# Patient Record
Sex: FEMALE | Race: WHITE | NOT HISPANIC OR LATINO | Employment: STUDENT | URBAN - METROPOLITAN AREA
[De-identification: names, ages, dates, MRNs, and addresses within clinical notes are randomized per-mention and may not be internally consistent; named-entity substitution may affect disease eponyms.]

---

## 2018-08-27 DIAGNOSIS — Z01.812 PRE-OPERATIVE LABORATORY EXAMINATION: ICD-10-CM

## 2018-08-27 LAB
ANION GAP SERPL CALC-SCNC: 8 MMOL/L (ref 0–11.9)
BUN SERPL-MCNC: 12 MG/DL (ref 8–22)
CALCIUM SERPL-MCNC: 9.5 MG/DL (ref 8.5–10.5)
CHLORIDE SERPL-SCNC: 102 MMOL/L (ref 96–112)
CO2 SERPL-SCNC: 27 MMOL/L (ref 20–33)
CREAT SERPL-MCNC: 0.78 MG/DL (ref 0.5–1.4)
ERYTHROCYTE [DISTWIDTH] IN BLOOD BY AUTOMATED COUNT: 39.1 FL (ref 35.9–50)
GLUCOSE SERPL-MCNC: 94 MG/DL (ref 65–99)
HCT VFR BLD AUTO: 42.8 % (ref 37–47)
HGB BLD-MCNC: 14.6 G/DL (ref 12–16)
MCH RBC QN AUTO: 30.9 PG (ref 27–33)
MCHC RBC AUTO-ENTMCNC: 34.1 G/DL (ref 33.6–35)
MCV RBC AUTO: 90.7 FL (ref 81.4–97.8)
PLATELET # BLD AUTO: 256 K/UL (ref 164–446)
PMV BLD AUTO: 10.4 FL (ref 9–12.9)
POTASSIUM SERPL-SCNC: 3.9 MMOL/L (ref 3.6–5.5)
RBC # BLD AUTO: 4.72 M/UL (ref 4.2–5.4)
SODIUM SERPL-SCNC: 137 MMOL/L (ref 135–145)
WBC # BLD AUTO: 7.6 K/UL (ref 4.8–10.8)

## 2018-08-27 PROCEDURE — 85027 COMPLETE CBC AUTOMATED: CPT

## 2018-08-27 PROCEDURE — 36415 COLL VENOUS BLD VENIPUNCTURE: CPT

## 2018-08-27 PROCEDURE — 80048 BASIC METABOLIC PNL TOTAL CA: CPT

## 2018-08-27 RX ORDER — BUPROPION HYDROCHLORIDE 150 MG/1
150 TABLET, EXTENDED RELEASE ORAL 2 TIMES DAILY
Status: ON HOLD | COMMUNITY
End: 2022-01-21

## 2018-08-27 RX ORDER — ACYCLOVIR 400 MG/1
400 TABLET ORAL 3 TIMES DAILY
Status: ON HOLD | COMMUNITY
End: 2022-01-21

## 2018-08-27 RX ORDER — DIPHENHYDRAMINE HCL 25 MG
25 TABLET ORAL NIGHTLY PRN
Status: ON HOLD | COMMUNITY
End: 2022-01-21

## 2018-08-27 RX ORDER — LEVOTHYROXINE SODIUM 112 UG/1
112 TABLET ORAL
COMMUNITY

## 2018-09-10 ENCOUNTER — HOSPITAL ENCOUNTER (OUTPATIENT)
Facility: MEDICAL CENTER | Age: 38
End: 2018-09-11
Attending: SPECIALIST | Admitting: SPECIALIST
Payer: COMMERCIAL

## 2018-09-10 DIAGNOSIS — G89.18 POST-OP PAIN: ICD-10-CM

## 2018-09-10 DIAGNOSIS — K56.7 ILEUS (HCC): ICD-10-CM

## 2018-09-10 DIAGNOSIS — F51.04 CHRONIC INSOMNIA: ICD-10-CM

## 2018-09-10 DIAGNOSIS — R11.0 NAUSEA: ICD-10-CM

## 2018-09-10 LAB — HCG SERPL QL: NEGATIVE

## 2018-09-10 PROCEDURE — 160041 HCHG SURGERY MINUTES - EA ADDL 1 MIN LEVEL 4: Performed by: SPECIALIST

## 2018-09-10 PROCEDURE — 160002 HCHG RECOVERY MINUTES (STAT): Performed by: SPECIALIST

## 2018-09-10 PROCEDURE — 84703 CHORIONIC GONADOTROPIN ASSAY: CPT

## 2018-09-10 PROCEDURE — 302135 SEQUENTIAL COMPRESSION MACHINE: Performed by: SPECIALIST

## 2018-09-10 PROCEDURE — 700101 HCHG RX REV CODE 250

## 2018-09-10 PROCEDURE — 501330 HCHG SET, CYSTO IRRIG TUBING: Performed by: SPECIALIST

## 2018-09-10 PROCEDURE — 700111 HCHG RX REV CODE 636 W/ 250 OVERRIDE (IP)

## 2018-09-10 PROCEDURE — 700102 HCHG RX REV CODE 250 W/ 637 OVERRIDE(OP)

## 2018-09-10 PROCEDURE — 500854 HCHG NEEDLE, INSUFFLATION FOR STEP: Performed by: SPECIALIST

## 2018-09-10 PROCEDURE — 501838 HCHG SUTURE GENERAL: Performed by: SPECIALIST

## 2018-09-10 PROCEDURE — 160036 HCHG PACU - EA ADDL 30 MINS PHASE I: Performed by: SPECIALIST

## 2018-09-10 PROCEDURE — 500800 HCHG LAPAROSCOPIC J/L HOOK: Performed by: SPECIALIST

## 2018-09-10 PROCEDURE — 160035 HCHG PACU - 1ST 60 MINS PHASE I: Performed by: SPECIALIST

## 2018-09-10 PROCEDURE — 160048 HCHG OR STATISTICAL LEVEL 1-5: Performed by: SPECIALIST

## 2018-09-10 PROCEDURE — 51798 US URINE CAPACITY MEASURE: CPT

## 2018-09-10 PROCEDURE — 502703 HCHG DEVICE, LIGASURE V SEALER: Performed by: SPECIALIST

## 2018-09-10 PROCEDURE — A9270 NON-COVERED ITEM OR SERVICE: HCPCS | Performed by: SPECIALIST

## 2018-09-10 PROCEDURE — 700102 HCHG RX REV CODE 250 W/ 637 OVERRIDE(OP): Performed by: SPECIALIST

## 2018-09-10 PROCEDURE — 700104 HCHG RX REV CODE 254

## 2018-09-10 PROCEDURE — 500002 HCHG ADHESIVE, DERMABOND: Performed by: SPECIALIST

## 2018-09-10 PROCEDURE — A9270 NON-COVERED ITEM OR SERVICE: HCPCS

## 2018-09-10 PROCEDURE — G0378 HOSPITAL OBSERVATION PER HR: HCPCS

## 2018-09-10 PROCEDURE — 500886 HCHG PACK, LAPAROSCOPY: Performed by: SPECIALIST

## 2018-09-10 PROCEDURE — A4338 INDWELLING CATHETER LATEX: HCPCS | Performed by: SPECIALIST

## 2018-09-10 PROCEDURE — 502587 HCHG PACK, D&C: Performed by: SPECIALIST

## 2018-09-10 PROCEDURE — 501577 HCHG TROCAR, STEP 11MM: Performed by: SPECIALIST

## 2018-09-10 PROCEDURE — 160009 HCHG ANES TIME/MIN: Performed by: SPECIALIST

## 2018-09-10 PROCEDURE — 160029 HCHG SURGERY MINUTES - 1ST 30 MINS LEVEL 4: Performed by: SPECIALIST

## 2018-09-10 PROCEDURE — 88307 TISSUE EXAM BY PATHOLOGIST: CPT

## 2018-09-10 RX ORDER — OXYCODONE HYDROCHLORIDE AND ACETAMINOPHEN 5; 325 MG/1; MG/1
1-2 TABLET ORAL EVERY 4 HOURS PRN
Status: DISCONTINUED | OUTPATIENT
Start: 2018-09-10 | End: 2018-09-11 | Stop reason: HOSPADM

## 2018-09-10 RX ORDER — IBUPROFEN 800 MG/1
800 TABLET ORAL EVERY 8 HOURS PRN
Status: DISCONTINUED | OUTPATIENT
Start: 2018-09-10 | End: 2018-09-11 | Stop reason: HOSPADM

## 2018-09-10 RX ORDER — SODIUM CHLORIDE, SODIUM LACTATE, POTASSIUM CHLORIDE, CALCIUM CHLORIDE 600; 310; 30; 20 MG/100ML; MG/100ML; MG/100ML; MG/100ML
INJECTION, SOLUTION INTRAVENOUS CONTINUOUS
Status: DISCONTINUED | OUTPATIENT
Start: 2018-09-10 | End: 2018-09-11 | Stop reason: HOSPADM

## 2018-09-10 RX ORDER — OXYCODONE HCL 5 MG/5 ML
SOLUTION, ORAL ORAL
Status: COMPLETED
Start: 2018-09-10 | End: 2018-09-10

## 2018-09-10 RX ORDER — HYDROMORPHONE HYDROCHLORIDE 2 MG/ML
INJECTION, SOLUTION INTRAMUSCULAR; INTRAVENOUS; SUBCUTANEOUS
Status: COMPLETED
Start: 2018-09-10 | End: 2018-09-10

## 2018-09-10 RX ORDER — SIMETHICONE 80 MG
80 TABLET,CHEWABLE ORAL EVERY 8 HOURS PRN
Status: DISCONTINUED | OUTPATIENT
Start: 2018-09-10 | End: 2018-09-11 | Stop reason: HOSPADM

## 2018-09-10 RX ORDER — BUPIVACAINE HYDROCHLORIDE AND EPINEPHRINE 2.5; 5 MG/ML; UG/ML
INJECTION, SOLUTION INFILTRATION; PERINEURAL
Status: DISCONTINUED | OUTPATIENT
Start: 2018-09-10 | End: 2018-09-10 | Stop reason: HOSPADM

## 2018-09-10 RX ORDER — ONDANSETRON 2 MG/ML
4 INJECTION INTRAMUSCULAR; INTRAVENOUS EVERY 6 HOURS PRN
Status: DISCONTINUED | OUTPATIENT
Start: 2018-09-10 | End: 2018-09-11 | Stop reason: HOSPADM

## 2018-09-10 RX ORDER — ZOLPIDEM TARTRATE 5 MG/1
5 TABLET ORAL NIGHTLY PRN
Status: DISCONTINUED | OUTPATIENT
Start: 2018-09-10 | End: 2018-09-11 | Stop reason: HOSPADM

## 2018-09-10 RX ADMIN — OXYCODONE HYDROCHLORIDE AND ACETAMINOPHEN 1 TABLET: 5; 325 TABLET ORAL at 23:07

## 2018-09-10 RX ADMIN — FENTANYL CITRATE 50 MCG: 50 INJECTION, SOLUTION INTRAMUSCULAR; INTRAVENOUS at 19:00

## 2018-09-10 RX ADMIN — FENTANYL CITRATE 50 MCG: 50 INJECTION, SOLUTION INTRAMUSCULAR; INTRAVENOUS at 19:08

## 2018-09-10 RX ADMIN — OXYCODONE HYDROCHLORIDE AND ACETAMINOPHEN 1 TABLET: 5; 325 TABLET ORAL at 22:39

## 2018-09-10 RX ADMIN — SODIUM CHLORIDE, SODIUM LACTATE, POTASSIUM CHLORIDE, CALCIUM CHLORIDE 1000 ML: 600; 310; 30; 20 INJECTION, SOLUTION INTRAVENOUS at 12:15

## 2018-09-10 RX ADMIN — OXYCODONE HYDROCHLORIDE 10 MG: 5 SOLUTION ORAL at 19:00

## 2018-09-10 RX ADMIN — FENTANYL CITRATE 50 MCG: 50 INJECTION, SOLUTION INTRAMUSCULAR; INTRAVENOUS at 19:18

## 2018-09-10 RX ADMIN — HYDROMORPHONE HYDROCHLORIDE 0.2 MG: 2 INJECTION INTRAMUSCULAR; INTRAVENOUS; SUBCUTANEOUS at 19:36

## 2018-09-10 ASSESSMENT — PATIENT HEALTH QUESTIONNAIRE - PHQ9
1. LITTLE INTEREST OR PLEASURE IN DOING THINGS: NOT AT ALL
2. FEELING DOWN, DEPRESSED, IRRITABLE, OR HOPELESS: NOT AT ALL
SUM OF ALL RESPONSES TO PHQ9 QUESTIONS 1 AND 2: 0

## 2018-09-10 ASSESSMENT — PAIN SCALES - GENERAL
PAINLEVEL_OUTOF10: 5
PAINLEVEL_OUTOF10: 4
PAINLEVEL_OUTOF10: 7
PAINLEVEL_OUTOF10: 9
PAINLEVEL_OUTOF10: 6
PAINLEVEL_OUTOF10: 0
PAINLEVEL_OUTOF10: 8
PAINLEVEL_OUTOF10: 8
PAINLEVEL_OUTOF10: 0
PAINLEVEL_OUTOF10: 8
PAINLEVEL_OUTOF10: 4

## 2018-09-10 ASSESSMENT — LIFESTYLE VARIABLES: EVER_SMOKED: NEVER

## 2018-09-10 NOTE — H&P
Sarah Nelson          YOB: 1980  Date of today's surgery: Monday, September 10, 2018  Facility: [facility]    ID: The patient is a very pleasant 38-year-old multipara (para-2, with 2 previous  sections).    Chief Complaint: The patient complains of menorrhagia accompanied by dysmenorrhea.    History of Present Illness: The patient has had complaints of menorrhagia and dysmenorrhea and has been diagnosed as having endometriosis. The patient is a very pleasant 38-year-old multipara with 2 previous  sections who has had menorrhagia and dysmenorrhea and she is scheduled today to have a total of is, hysterectomy, bilateral salpingectomy, followed by cystoscopy.  She has a history of endometriosis.  She also has a history of premenstrual migraine headaches.  She has been on Lupron.  She discontinued Lupron Astudillo she discontinued the Lupron she has had multiple menstrual periods and she says that since her Lupron therapy her menstrual periods have been characterized by very heavy blood flow.  She does have dysmenorrhea.  She complains of pain with ovulation.  She says that following her last Lupron injection she has been having severe joint pain and she says that she believes that this joint pain was caused by Lupron and she says that it was because of this what she feels is a side effect of Lupron (her arthralgias) that she decided to stop taking the Lupron.  I have discussed with the patient and explained to the patient in detail and at length what total laparoscopic hysterectomy, bilateral salpingectomy, followed by cystoscopy is and what total laparoscopic hysterectomy, bilateral salpingectomy, followed by cystoscopy involves, and I have discussed with her and explained to her in detail and at length the risks and benefits and alternatives of total laparoscopic hysterectomy, bilateral salpingectomy, followed by cystoscopy.  After our discussions and after  answering her questions she told me that she very much wishes for us to proceed with total laparoscopic hysterectomy, bilateral salpingectomy, followed by cystoscopy.    Past Medical History: The patient says that she has been diagnosed as having Hashimoto’s thyroiditis.  She has a history of migraine headaches.    Past Surgical History: The patient has had breast augmentation.  She has had 2 previous  sections.    Family History: non contributory    Medications: The patient says that she takes levothyroxine 125 micrograms per day.  She also takes Wellbutrin 150 milligrams once per day.    Allergies: The patient says that she is allergic to sulfa drugs.    Social History: The patient denies smoking.  She says she only rarely consumes alcoholic beverages.  She denies the use of recreational drugs.    Review of Systems  General: The patient denies any fevers, chills, sweats.  Pulmonary: The patient denies any coughing, wheezing, chest pain, shortness of breath.  Cardiovascular: The patient denies any palpitations, dyspnea, chest pain.  Gastrointestinal: The patient denies any nausea, vomiting, diarrhea, constipation, hematochezia, melena, history of hepatitis, history of jaundice.  Genitourinary: The patient complains of menorrhagia and dysmenorrhea  Musculoskeletal: The patient has had arthralgias following her last Lupron injection.  She denies myalgias.   Neurological: The patient says she has history of migraine headaches.  She denies any syncope or seizures.     Physical Exam:   Vital Signs: The patient's vital signs are stable and she is afebrile.  General: The patient appears well developed and well nourished and relaxed and alert and comfortable and in no apparent distress.    HEENT :  Normo-cephalic, atraumatic, pupils equal, round, reactive to light and accommodation, extra ocular motions intact, pharynx clear; there is no thyromegaly. There is no cervical lymphadenopathy.  Chest: Heart regular rate  and rhythm, with no murmurs or rubs or gallops; the lungs are clear to auscultation bilaterally.  Abdomen: The abdomen is soft and flat and non-tender and non-distended. There is no hepatomegaly. There is no splenomegaly.   Pelvic: Speculum exam reveals that there are no vulvar or vaginal or cervical lesions and that there is no cervical or vaginal discharge for the vulva and vaginal mucosa appear well estrogenized.  Bimanual exam reveals no evidence of cervical motion tenderness and no evidence of any tenderness to palpation of the uterine corpus and no evidence of uterine enlargement and no evidence of any adnexal masses or tenderness either on the right or the left.  Extremities: No clubbing or cyanosis or edema.   Neurological: non-focal.     Assessment:   Endometriosis  Menorrhagia  Dysmenorrhea    Plan:   We will proceed today with total laparoscopic hysterectomy, bilateral salpingectomy, followed by cystoscopy.  Please see above.            ________________________  Akash Cullen M.D.

## 2018-09-11 VITALS
HEART RATE: 108 BPM | OXYGEN SATURATION: 96 % | WEIGHT: 193.78 LBS | HEIGHT: 63 IN | SYSTOLIC BLOOD PRESSURE: 102 MMHG | BODY MASS INDEX: 34.34 KG/M2 | RESPIRATION RATE: 18 BRPM | TEMPERATURE: 98.3 F | DIASTOLIC BLOOD PRESSURE: 59 MMHG

## 2018-09-11 LAB
BASOPHILS # BLD AUTO: 0.2 % (ref 0–1.8)
BASOPHILS # BLD: 0.03 K/UL (ref 0–0.12)
EOSINOPHIL # BLD AUTO: 0 K/UL (ref 0–0.51)
EOSINOPHIL NFR BLD: 0 % (ref 0–6.9)
ERYTHROCYTE [DISTWIDTH] IN BLOOD BY AUTOMATED COUNT: 40.5 FL (ref 35.9–50)
HCT VFR BLD AUTO: 36.8 % (ref 37–47)
HGB BLD-MCNC: 12.5 G/DL (ref 12–16)
IMM GRANULOCYTES # BLD AUTO: 0.06 K/UL (ref 0–0.11)
IMM GRANULOCYTES NFR BLD AUTO: 0.4 % (ref 0–0.9)
LYMPHOCYTES # BLD AUTO: 1.07 K/UL (ref 1–4.8)
LYMPHOCYTES NFR BLD: 7.2 % (ref 22–41)
MCH RBC QN AUTO: 31.6 PG (ref 27–33)
MCHC RBC AUTO-ENTMCNC: 34 G/DL (ref 33.6–35)
MCV RBC AUTO: 93.2 FL (ref 81.4–97.8)
MONOCYTES # BLD AUTO: 0.75 K/UL (ref 0–0.85)
MONOCYTES NFR BLD AUTO: 5.1 % (ref 0–13.4)
NEUTROPHILS # BLD AUTO: 12.93 K/UL (ref 2–7.15)
NEUTROPHILS NFR BLD: 87.1 % (ref 44–72)
NRBC # BLD AUTO: 0 K/UL
NRBC BLD-RTO: 0 /100 WBC
PLATELET # BLD AUTO: 180 K/UL (ref 164–446)
PMV BLD AUTO: 10.6 FL (ref 9–12.9)
RBC # BLD AUTO: 3.95 M/UL (ref 4.2–5.4)
WBC # BLD AUTO: 14.8 K/UL (ref 4.8–10.8)

## 2018-09-11 PROCEDURE — G0378 HOSPITAL OBSERVATION PER HR: HCPCS

## 2018-09-11 PROCEDURE — 85025 COMPLETE CBC W/AUTO DIFF WBC: CPT

## 2018-09-11 PROCEDURE — A9270 NON-COVERED ITEM OR SERVICE: HCPCS | Performed by: SPECIALIST

## 2018-09-11 PROCEDURE — 700102 HCHG RX REV CODE 250 W/ 637 OVERRIDE(OP): Performed by: SPECIALIST

## 2018-09-11 RX ORDER — IBUPROFEN 800 MG/1
800 TABLET ORAL EVERY 8 HOURS PRN
Qty: 30 TAB | Refills: 0 | Status: ON HOLD | OUTPATIENT
Start: 2018-09-11 | End: 2022-01-21

## 2018-09-11 RX ORDER — OXYCODONE HYDROCHLORIDE AND ACETAMINOPHEN 5; 325 MG/1; MG/1
1 TABLET ORAL EVERY 6 HOURS PRN
Qty: 28 TAB | Refills: 0 | Status: SHIPPED | OUTPATIENT
Start: 2018-09-11 | End: 2018-09-18

## 2018-09-11 RX ADMIN — OXYCODONE HYDROCHLORIDE AND ACETAMINOPHEN 1 TABLET: 5; 325 TABLET ORAL at 03:42

## 2018-09-11 RX ADMIN — OXYCODONE HYDROCHLORIDE AND ACETAMINOPHEN 2 TABLET: 5; 325 TABLET ORAL at 08:28

## 2018-09-11 RX ADMIN — IBUPROFEN 800 MG: 800 TABLET, FILM COATED ORAL at 03:40

## 2018-09-11 ASSESSMENT — LIFESTYLE VARIABLES
ALCOHOL_USE: YES
AVERAGE NUMBER OF DAYS PER WEEK YOU HAVE A DRINK CONTAINING ALCOHOL: 0
EVER FELT BAD OR GUILTY ABOUT YOUR DRINKING: NO
TOTAL SCORE: 0
HAVE YOU EVER FELT YOU SHOULD CUT DOWN ON YOUR DRINKING: NO
HOW MANY TIMES IN THE PAST YEAR HAVE YOU HAD 5 OR MORE DRINKS IN A DAY: 0
CONSUMPTION TOTAL: NEGATIVE
TOTAL SCORE: 0
EVER HAD A DRINK FIRST THING IN THE MORNING TO STEADY YOUR NERVES TO GET RID OF A HANGOVER: NO
TOTAL SCORE: 0
HAVE PEOPLE ANNOYED YOU BY CRITICIZING YOUR DRINKING: NO
ON A TYPICAL DAY WHEN YOU DRINK ALCOHOL HOW MANY DRINKS DO YOU HAVE: 1

## 2018-09-11 ASSESSMENT — COPD QUESTIONNAIRES
COPD SCREENING SCORE: 0
DURING THE PAST 4 WEEKS HOW MUCH DID YOU FEEL SHORT OF BREATH: NONE/LITTLE OF THE TIME
IN THE PAST 12 MONTHS DO YOU DO LESS THAN YOU USED TO BECAUSE OF YOUR BREATHING PROBLEMS: DISAGREE/UNSURE
HAVE YOU SMOKED AT LEAST 100 CIGARETTES IN YOUR ENTIRE LIFE: NO/DON'T KNOW
DO YOU EVER COUGH UP ANY MUCUS OR PHLEGM?: NO/ONLY WITH OCCASIONAL COLDS OR INFECTIONS

## 2018-09-11 ASSESSMENT — PAIN SCALES - GENERAL
PAINLEVEL_OUTOF10: 7
PAINLEVEL_OUTOF10: 5
PAINLEVEL_OUTOF10: 3

## 2018-09-11 NOTE — PROGRESS NOTES
Pt able to void without difficulty, quantity sufficient, bladder scan less than 200. Denies any bladder distention. peripad with very scant serosang drainage. No n/v.

## 2018-09-11 NOTE — PROGRESS NOTES
The patient is today postoperative day #1 status post total laparoscopic hysterectomy and bilateral salpingectomy followed by cystoscopy.  She tells me today that she has some abdominal and pelvic soreness and pelvic cramping pain. She says she feels fine otherwise and has no other problems or complaints.  The patient says that her Holden catheter was removed earlier this morning and says that she subsequently has been able to empty her bladder. She is ambulating and urinating and tolerating a regular diet.  The patient's vital signs are stable and she is afebrile.  The patient this morning appears well-developed and well-nourished and relaxed and alert and comfortable and in no apparent distress.  Labs: The patient's hemoglobin this morning is 12.5 g/dL. Her white count this morning is 14.8 and her platelet count this morning is 180,000.  The patient says that she would be interested in going home today.  We will discharge the patient home today with prescriptions for Percocet and ibuprofen. I asked her to follow-up with me in the office in about 2 weeks for postoperative visit and she replied that she aren't he has this appointment scheduled. I asked her to call or contact me at any time should she ever has any problems or questions or complaints and she said that she would do so.  Akash Cullen M.D.

## 2018-09-11 NOTE — OR NURSING
RECEIVED FROM OR WITH DR MEDEROS.  ORAL AIRWAY IN PLACE.  VSS.  AIRWAY DC'D.  PATIENT DENIES PAIN/NAUSEA.  BAND-AIDS ON ABDOMEN DRY AND IN TACT X 3.  ENMANUEL PAD DRY.  MERRITT CATHETER IN PLACE.  1855 C/O PAIN 8/10.  MEDICATED PER ORDER.  TOLERATING PO FLUIDS.  1900 REPORT TO YOVANY SEGOVIA.

## 2018-09-11 NOTE — PROGRESS NOTES
Pt admitted to room T212 from PACU at 2000.  Pt A&O . Abdominal pain reported at 5 on a scale of 0-10. Oriented to room call light and vitals frequency. Lap sites x4 on abdomen with band aid CDI. hypoactive BS t/o. No flatus. Reviewed plan of care: voiding trial, labs diet, fall precautions, skin care, labs,and pain management) with patient and family. Admit profile done. Passed RN bedside swallow evaluation without s/sx of aspiration. All questions answered. Verbalized understanding and agrees. Able to make needs known.

## 2018-09-11 NOTE — PROGRESS NOTES
Assumed care of Pt at 0700 after report from May, RN, assessment complete.  Pt resting comfortably, A & O X 4, VSS; Pt denies pain, discomfort at this time; updated on and understands POC to discharge this AM.  Bed in low position, call light within reach - will continue to monitor.    Pt given and understands discharge instructions, (2) Rx.  PIV removed, covered and wrapped with coban.  Waiting for ride home with .

## 2018-09-11 NOTE — DISCHARGE INSTRUCTIONS
Hysterectomy Information  A hysterectomy is a surgery to remove your uterus. After surgery, you will no longer have periods. Also, you will not be able to get pregnant.  Reasons for this surgery  · You have bleeding that is not normal and keeps coming back.  · You have lasting (chronic) lower belly (pelvic) pain.  · You have a lasting infection.  · The lining of your uterus grows outside your uterus.  · The lining of your uterus grows in the muscle of your uterus.  · Your uterus falls down into your vagina.  · You have a growth in your uterus that causes problems.  · You have cells that could turn into cancer (precancerous cells).  · You have cancer of the uterus or cervix.  Types  There are 3 types of hysterectomies. Depending on the type, the surgery will:  · Remove the top part of the uterus only.  · Remove the uterus and the cervix.  · Remove the uterus, cervix, and tissue that holds the uterus in place in the lower belly.  Ways a hysterectomy can be performed  There are 5 ways this surgery can be performed.  · A cut (incision) is made in the belly (abdomen). The uterus is taken out through the cut.  · A cut is made in the vagina. The uterus is taken out through the cut.  · Three or four cuts are made in the belly. A surgical device with a camera is put through one of the cuts. The uterus is cut into small pieces. The uterus is taken out through the cuts or the vagina.  · Three or four cuts are made in the belly. A surgical device with a camera is put through one of the cuts. The uterus is taken out through the vagina.  · Three or four cuts are made in the belly. A surgical device that is controlled by a computer makes a visual image. The device helps the surgeon control the surgical tools. The uterus is cut into small pieces. The pieces are taken out through the cuts or through the vagina.  What can I expect after the surgery?  · You will be given pain medicine.  · You will need help at home for 3-5 days after  surgery.  · You will need to see your doctor in 2-4 weeks after surgery.  · You may get hot flashes, have night sweats, and have trouble sleeping.  · You may need to have Pap tests in the future if your surgery was related to cancer. Talk to your doctor. It is still good to have regular exams.  This information is not intended to replace advice given to you by your health care provider. Make sure you discuss any questions you have with your health care provider.  Document Released: 03/11/2013 Document Revised: 05/25/2017 Document Reviewed: 08/25/2014  [employer] Interactive Patient Education © 2017 [employer]    Discharge Instructions    Discharged to home by car with relative. Discharged via walking, hospital escort: Yes.  Special equipment needed: Not Applicable    Be sure to schedule a follow-up appointment with your primary care doctor or any specialists as instructed.     Discharge Plan:   Diet Plan: Discussed  Activity Level: Discussed  Confirmed Follow up Appointment: Appointment Scheduled  Confirmed Symptoms Management: Discussed  Medication Reconciliation Updated: Yes  Influenza Vaccine Indication: Patient Refuses    I understand that a diet low in cholesterol, fat, and sodium is recommended for good health. Unless I have been given specific instructions below for another diet, I accept this instruction as my diet prescription.   Other diet: Regular    Special Instructions: None    · Is patient discharged on Warfarin / Coumadin?   No     Depression / Suicide Risk    As you are discharged from this [facility] facility, it is important to learn how to keep safe from harming yourself.    Recognize the warning signs:  · Abrupt changes in personality, positive or negative- including increase in energy   · Giving away possessions  · Change in eating patterns- significant weight changes-  positive or negative  · Change in sleeping patterns- unable to sleep or sleeping all the time   · Unwillingness or  inability to communicate  · Depression  · Unusual sadness, discouragement and loneliness  · Talk of wanting to die  · Neglect of personal appearance   · Rebelliousness- reckless behavior  · Withdrawal from people/activities they love  · Confusion- inability to concentrate     If you or a loved one observes any of these behaviors or has concerns about self-harm, here's what you can do:  · Talk about it- your feelings and reasons for harming yourself  · Remove any means that you might use to hurt yourself (examples: pills, rope, extension cords, firearm)  · Get professional help from the community (Mental Health, Substance Abuse, psychological counseling)  · Do not be alone:Call your Safe Contact- someone whom you trust who will be there for you.  · Call your local CRISIS HOTLINE 553-4899 or 792-888-9705  · Call your local Children's Mobile Crisis Response Team [place] (091) 927-4756 or www.Kas[employer]-sou  · Call the toll free National Suicide Prevention Hotlines   · National Suicide Prevention Lifeline 571-070-SMKO (5766)  · National Hope Line Network 800-SUICIDE (650-5509)

## 2018-09-11 NOTE — PROGRESS NOTES
Pt admitted to room T212 from PACU at 2000.  Pt a&ox4 . Pain reported at 5 on a scale of 0-10. Oriented to room call light and vitals frequency. On RA. Respirations equal and unlabored.  Reviewed plan of care: voiding trial, labs, diet, fall precautions, skin care, labs,and pain management) with patient and family. Admit profile done. Passed RN bedside swallow evaluation without s/sx of aspiration. All questions answered. Verbalized understanding and agrees. Able to make needs known.

## 2018-09-11 NOTE — OR SURGEON
Immediate Post OP Note    PreOp Diagnosis:   Endometriosis  Menorrhagia  Dysmenorrhea    PostOp Diagnosis:   Endometriosis  Menorrhagia  Dysmenorrhea    Procedure(s):  HYSTERECTOMY LAPAROSCOPY - Wound Class: Clean Contaminated  SALPINGECTOMY - Wound Class: Clean Contaminated  CYSTOSCOPY - Wound Class: Clean Contaminated    Surgeon(s):  TAL Tipton M.D.    Anesthesiologist/Type of Anesthesia:  Anesthesiologist: Kevin Giang M.D./General    Surgical Staff:  Circulator: Desi Tran R.N.  Scrub Person: Shawn Araiza; Karma Lehman    Specimens removed if any:  Uterus  Both fallopian tubes    Estimated Blood Loss:   Approximately 200 mL    Findings:   Speculum exam reveals that there are no vulvar or vaginal or cervical lesions in the cervix appears somewhat nulliparous.  At laparoscopy bilateral evidence of previous bilateral tubal ligation as noted.  Both ovaries appear normal in both ovarian fossa appeared normal.  At cystoscopy bilateral vigorous ureteral jets of blue urine are seen indicating that both ureters are patent.    Complications:   None        9/10/2018 6:30 PM Akash Cullen M.D.

## 2018-09-11 NOTE — PROGRESS NOTES
Holden cath dcd and tolerated well. Instructed to call prior to getting oob. Plan of care reviewed. Verbalized understanding and agrees.

## 2018-09-11 NOTE — OR NURSING
1900  Report rec'd from Chad SEGOVIA; assumed care of pt at this time.  Pt is awake, resting comfortably.      1908  Second dose of Fentanyl given for 8/10 abd cramping    1911  S.O. At bedside and pt belongings retrieved from locker and in pt possession at bedside.    1918  Third dose of fentanyl given for 7/10 abd cramping.  Abd assessed; 4 sites noted - one umbilical, one mid pubic/perineum site and 2 BL lower abd sites.  All wounds w/island dressings, and all cdi.  Abd soft. No jeremie bleeding noted.  FC in place and patent.    1924  Pt tolerating ice chips/oral fluids well.    1936  Dilaudid given for persistent abd cramping.  Pt rates it 6/10    1946  Phone report given to Zuri SEGOVIA on CDU.  Transfer request put in for transport.      1952  Pt still c/o abd cramping, but declines more pain medication at this time d/t it causing increased drowsiness.  Ice pack applied for comfort.    1958  Pt transported to CDU, T212.  S.O. Traveled with her.  Pt's belongings transported with her.

## 2018-09-11 NOTE — OP REPORT
DATE OF SERVICE:  09/10/2018    PREOPERATIVE DIAGNOSES:  1.  Endometriosis.  2.  Menorrhagia.  3.  Dysmenorrhea.    POSTOPERATIVE DIAGNOSES:  1.  Endometriosis.  2.  Menorrhagia.  3.  Dysmenorrhea.    PROCEDURES:  Total laparoscopic hysterectomy, bilateral salpingectomy, and   cystoscopy.    SURGEON:  Akash Cullen MD    ASSISTANT:  Marcelina Sánchez MD    ANESTHESIA:  General endotracheal tube anesthesia.    ANESTHESIOLOGIST:  Kevin Verma MD    FINDINGS:  Speculum exam under anesthesia reveals that there are no vulvar or   vaginal or cervical lesions.  The cervix appears to be somewhat nulliparous.    At laparoscopy, bilateral evidence of previous bilateral tubal ligation is   noted.  Both ovaries appeared normal and both ovarian fossae appear normal.    At cystoscopy, bilateral vigorous ureteral jets of blue urine are seen   indicating that both ureters are patent.    SPECIMENS:  1.  Uterus.  2.  Fallopian tubes.    COMPLICATIONS:  None.    ESTIMATED BLOOD LOSS:  Approximately 200 mL.    DESCRIPTION OF PROCEDURE:  After the appropriate consents have been obtained,   the patient was taken to the operating room and given general anesthesia.  She   was prepped and draped in the dorsal lithotomy position and Holden catheter   was noted to be in place and draining urine.  A speculum exam was performed   and reveals that there were no vulvar or vaginal or cervical lesions.  The   cervix was well visualized and was found to be somewhat nulliparous.  A single   tooth tenaculum was applied to the anterior aspect of the cervix.  A Vicryl   suture was placed at the 12 o'clock position of the cervix.  Second Vicryl   suture was placed in the cervix, this one at the 6 o'clock position of the   cervix.  These Vicryl sutures were placed after the cervix had been dilated   with Hegar dilators.  After the Vicryl sutures were placed, a medium uterine   manipulator instrument was advanced through the endocervical canal into  the   intrauterine cavity and the balloon at the tip of uterine elevator instrument   was inflated with about 10 mL of air.  The single tooth tenaculum was removed   from the cervix.  The needle on the Vicryl suture at the 12 o'clock position   of the cervix cut off and set aside.  The two ends of the Vicryl suture at the   12 o'clock position of the cervix were placed through 2 adjacent holes on one   side of the inner cup of the uterine manipulator instrument.  The 2 ends of   the Vicryl suture at the 6 o'clock position of the cervix were placed through   2 adjacent holes on the opposite side of the inner cup of the uterine   manipulator instrument and needle on this Vicryl suture at the 6 o'clock   position of the cervix was cut off and set aside.  The speculum was removed.    The inner cup of the uterine manipulator instrument was advanced along the   shaft of uterine manipulator into the vaginal vault and pressed against the   cervix.  The 2 ends of the Vicryl suture at the 12 o'clock position of the   cervix were tied several times and the excess Vicryl suture was cut off.  The   2 ends of the Vicryl suture at the 6 o'clock position of the cervix were tied   several times and excess Vicryl suture was cut off.  The blue portion of the   uterine manipulator instrument was advanced along the shaft of the uterine   manipulator instruments into the vaginal vault and secured in the usual   fashion.  The 's gloves were changed.  Attention was directed to the   abdomen where a small (approximately 1.5 cm) horizontal infraumbilical   incision was made with a scalpel.  A Veress needle was advanced through this   incision into the peritoneal cavity and proper placement of the peritoneal   cavity was verified with palmar hanging drop technique.  The peritoneal cavity   was then insufflated with approximately 2-3 liters of carbon dioxide gas.    The Veress needle was removed and an 11 mm port was placed through the    infraumbilical incision into the peritoneal cavity utilizing the VersaStep   trocar system.  The central portion of this port was removed and 10 mm   30-degree laparoscope was inserted through the remaining sleeve and proper   entry into the peritoneal cavity was verified visually with laparoscope.  The   patient was placed in Trendelenburg position.  The uterus was mobilized and   findings were as noted above.  An 11 mm port was placed in left lower quadrant   under direct laparoscopic visualization utilizing the VersaStep trocar   system.  An 11 mm port was placed in the right lower quadrant under direct   laparoscopic visualization after the overlying skin and subcutaneous tissues   have been infiltrated with local anesthetic and utilizing the VersaStep trocar   system.  An 11 mm port was placed suprapubically under direct laparoscopic   visualization after the overlying skin and subcutaneous tissues have been   infiltrated with local anesthetic and utilizing the VersaStep trocar system.    It should be noted that during this procedure, laparoscope was placed not only   through the infraumbilical port, but at times through the left lower quadrant   port and other times through the right lower quadrant port.  The LigaSure   [place] 10 mm bipolar cutting forceps was used to thoroughly cauterize and seal   and cut the right proximal round ligament.  The LigaSure [place] 10 mm bipolar   cutting forceps was used to thoroughly cauterize, seal, and cut the right   proper ovarian ligament.  The LigaSure [place] 10 mm bipolar cutting forceps was   used to thoroughly cauterize and sealed the tissue in between the incision in   the right proximal round ligament and the incision in the right proper   ovarian ligament.  Most proximal portion of the right broad ligament including   anterior and posterior leaves and including ipsilateral ascending branches,   uterine vessels were serially thoroughly cauterized, sealed, and cut down  to   around the level of the junction between the uterine corpus and uterine   cervix.  Attention was directed to the left side of the uterus.  The left   proximal round ligament was thoroughly cauterized, sealed, and cut with   LigaSure [place] 10 mm bipolar cutting forceps.  The LigaSure [place] 10 mm   bipolar cutting forceps was used to thoroughly cauterize and sealed and cut   the left proper ovarian ligament.  The tissue in between the incision in the   left proximal round ligament and left proper ovarian ligament was thoroughly   cauterized, sealed, and cut with LigaSure [place] 10 mm bipolar cutting forceps.    The most proximal portions of the left broad ligament including anterior and   posterior leaves including ipsilateral ascending branches, the uterine   vessels were serially thoroughly cauterized, sealed, and cut with the LigaSure   [place]arshall instrument.  The bladder was noted to be somewhat adherent anteriorly   ostensibly due to previous  sections.  The serosa overlying the   anterior lower uterine segment was incised from one side to the other in the   usual fashion with the Harmonic scalpel.  The bladder was dissected away   anteriorly with the Harmonic scalpel and blunt dissection in usual fashion and   eventually the bladder was dissected away nicely anteriorly.  Uterine vessels   were bilaterally exposed and once exposed thoroughly cauterized and sealed   with the LigaSure instrument and cut with a Harmonic scalpel.  The bladder was   then further dissected away anteriorly.  Uterosacral cardinal ligament   complexes were bilaterally thoroughly cauterized and sealed with their   insertions into the uterus and cut at their insertions into the uterus with a   Harmonic scalpel.  The bladder was dissected away more anteriorly.  At this   time, the uterine fundus was noted to be cyanotic and pale indicating ischemia   of the uterus.  Also, at this time, the junction between the uterine corpus   and  uterine cervix was nicely appreciated because the inner lip of the inner   cup of the uterine manipulator instrument.  The tissue overlying the inner lip   of the inner cup of the uterine manipulator instrument was circumferentially   incised mostly with a J hook monopolar cautery instrument, but at times with a   Harmonic scalpel.  Once this process was completed, the uterus has no more   attachments to the rest of body and was delivered in the vaginal vault and at   this time was left in the vaginal vault, so as to maintain pneumoperitoneum.    The vaginal cuff was reapproximated by placing several interrupted   figure-of-eight sutures of 0 Vicryl on CT-1 needles, always using   intracorporeal suturing technique and extracorporeal knot tying technique.    First, the angle sutures were placed, first angle suture on the right was   placed and then the angle suture on the left was placed.  After the angle   suture on the right was placed and the needle was cut off, the two ends of the   suture brought out through the right lower quadrant port and tension/traction   was placed in these sutures placed tension/traction on the right angle of the   vaginal cuff in order to enhance exposure of the vaginal cuff so as to   facilitate closure of the vaginal cuff.  It seems then performed on the left.    Sutures placed in the left angle of the vaginal cuff and the needle was cut   off and the 2 ends were brought out through the left lower quadrant port and   tension/traction was placed on these suture to place tension/traction in the   left angle of the vaginal cuff in order to enhance exposure of the vaginal   cuff so as to facilitate closure of the vaginal cuff.  After these 2 angled   sutures were placed, several figure-of-eight sutures were placed along the   length of the vaginal cuff in the usual fashion using intracorporeal suturing   technique and extracorporeal knot tying technique and the vaginal cuff was   nicely  reapproximated in this manner.  It should be noted that during this   procedure, gauze sponges were placed in the peritoneal cavity, in the pelvis,   no gauze sponge was placed, after the first gauze sponge was placed, until the   previous gauze sponge was removed.  The last gauze sponge was removed.  The   pelvis was copiously irrigated and drained and hemostasis was noted to be   excellent.  To assure continued hemostasis, fibrin thrombin powder was sprayed   along the entire length of the vaginal cuff.  The right fallopian tube was   identified and resected (right salpingectomy was performed) in the usual   fashion using the Harmonic scalpel and this right fallopian tube was submitted   and excellent hemostasis was observed.  The left fallopian tube was   identified and resected (left salpingectomy was performed) in the usual   fashion using the Harmonic scalpel and left fallopian tube was submitted as a   specimen and excellent hemostasis was observed.  Both ovaries were examined   and noted to be normal.  Both ovarian fossa were examined and noted to be   normal.  The patient was taken out of Trendelenburg position and lap and   needle counts reported to be correct at this time.  The laparoscope was   removed.  Air was allowed to evacuate the peritoneal cavity.  All ports were   removed.  The fascia underneath the infraumbilical incision was identified   with the use of S retractors and reapproximated with placement of simple   interrupted suture using Vicryl.  The fascia underneath the suprapubic   incision was identified with the use of S retractors and reapproximated with   placement of simple interrupted suture using Vicryl.  Skin incisions were   reapproximated with placement of multiple interrupted buried sutures of 4-0   Monocryl placed in the dermis.  The uterus was removed from the vaginal vault   and submitted.  The Holden catheter bulb was deflated and the Holden catheter   was removed.  It should be  noted that immediately following closure of the   vaginal cuff, the patient was given indigo carmine intravenously.  At no time   during laparoscopy was any blue dye seen in the operative field.  Prior to   completion of laparoscopy, the urine in the Holden catheter bag was found to be   blue.  The cystoscope was inserted through the urethra into the bladder and   cystoscopy was performed.  The right ureteral ostia was clearly identified.    Vigorous jet of urine was seen coming from the right ureteral ostia indicating   that the right ureter is patent.  The left ureteral ostia was then clearly   identified.  Next, a vigorous jet of blue urine was seen coming from the left   ureteral ostia indicating that the left ureter was patent.  The dome of the   bladder was examined and found to be normal.  Cystoscope was removed.  The   Holden catheter was replaced in the bladder.  Bimanual vaginal exam was then   performed and the vaginal cuff was palpated and found to be intact along its   entire length.  The procedure was terminated with final lap and needle counts   reported to be correct x2 at the end of the procedure.  Patient tolerated the   procedure well and sent to postanesthesia recovery in stable condition.       ____________________________________     CHAVA JACKSON MD    MED / NTS    DD:  09/10/2018 22:27:06  DT:  09/11/2018 01:37:38    D#:  [id number]  Job#:  [id number]    cc: Kevin Verma MD, Marcelina Sánchez MD

## 2022-01-19 ENCOUNTER — PRE-ADMISSION TESTING (OUTPATIENT)
Dept: ADMISSIONS | Facility: MEDICAL CENTER | Age: 42
DRG: 621 | End: 2022-01-19
Attending: COLON & RECTAL SURGERY
Payer: MEDICAID

## 2022-01-19 RX ORDER — MULTIVITAMIN
1 TABLET ORAL EVERY EVENING
COMMUNITY

## 2022-01-21 ENCOUNTER — ANESTHESIA (OUTPATIENT)
Dept: SURGERY | Facility: MEDICAL CENTER | Age: 42
DRG: 621 | End: 2022-01-21
Payer: MEDICAID

## 2022-01-21 ENCOUNTER — HOSPITAL ENCOUNTER (INPATIENT)
Facility: MEDICAL CENTER | Age: 42
LOS: 1 days | DRG: 621 | End: 2022-01-22
Attending: COLON & RECTAL SURGERY | Admitting: COLON & RECTAL SURGERY
Payer: MEDICAID

## 2022-01-21 ENCOUNTER — ANESTHESIA EVENT (OUTPATIENT)
Dept: SURGERY | Facility: MEDICAL CENTER | Age: 42
DRG: 621 | End: 2022-01-21
Payer: MEDICAID

## 2022-01-21 LAB
25(OH)D3 SERPL-MCNC: 40 NG/ML (ref 30–100)
ALBUMIN SERPL BCP-MCNC: 4.7 G/DL (ref 3.2–4.9)
ALBUMIN/GLOB SERPL: 1.7 G/DL
ALP SERPL-CCNC: 32 U/L (ref 30–99)
ALT SERPL-CCNC: 23 U/L (ref 2–50)
ANION GAP SERPL CALC-SCNC: 15 MMOL/L (ref 7–16)
AST SERPL-CCNC: 31 U/L (ref 12–45)
BILIRUB SERPL-MCNC: 0.5 MG/DL (ref 0.1–1.5)
BUN SERPL-MCNC: 15 MG/DL (ref 8–22)
CALCIUM SERPL-MCNC: 9.5 MG/DL (ref 8.5–10.5)
CHLORIDE SERPL-SCNC: 104 MMOL/L (ref 96–112)
CHOLEST SERPL-MCNC: 160 MG/DL (ref 100–199)
CO2 SERPL-SCNC: 18 MMOL/L (ref 20–33)
CREAT SERPL-MCNC: 0.66 MG/DL (ref 0.5–1.4)
ERYTHROCYTE [DISTWIDTH] IN BLOOD BY AUTOMATED COUNT: 36.9 FL (ref 35.9–50)
EST. AVERAGE GLUCOSE BLD GHB EST-MCNC: 126 MG/DL
EXTERNAL QUALITY CONTROL: NORMAL
FERRITIN SERPL-MCNC: 176 NG/ML (ref 10–291)
GLOBULIN SER CALC-MCNC: 2.8 G/DL (ref 1.9–3.5)
GLUCOSE SERPL-MCNC: 88 MG/DL (ref 65–99)
HBA1C MFR BLD: 6 % (ref 4–5.6)
HCT VFR BLD AUTO: 41.8 % (ref 37–47)
HDLC SERPL-MCNC: 38 MG/DL
HGB BLD-MCNC: 14.8 G/DL (ref 12–16)
INR PPP: 1.05 (ref 0.87–1.13)
IRON SERPL-MCNC: 64 UG/DL (ref 40–170)
LDLC SERPL CALC-MCNC: 101 MG/DL
MCH RBC QN AUTO: 31.4 PG (ref 27–33)
MCHC RBC AUTO-ENTMCNC: 35.4 G/DL (ref 33.6–35)
MCV RBC AUTO: 88.7 FL (ref 81.4–97.8)
PATHOLOGY CONSULT NOTE: NORMAL
PLATELET # BLD AUTO: 250 K/UL (ref 164–446)
PMV BLD AUTO: 10.8 FL (ref 9–12.9)
POTASSIUM SERPL-SCNC: 4.4 MMOL/L (ref 3.6–5.5)
PREALB SERPL-MCNC: 25 MG/DL (ref 18–38)
PROT SERPL-MCNC: 7.5 G/DL (ref 6–8.2)
PROTHROMBIN TIME: 13.4 SEC (ref 12–14.6)
RBC # BLD AUTO: 4.71 M/UL (ref 4.2–5.4)
SARS-COV+SARS-COV-2 AG RESP QL IA.RAPID: NEGATIVE
SODIUM SERPL-SCNC: 137 MMOL/L (ref 135–145)
TRANSFERRIN SERPL-MCNC: 257 MG/DL (ref 200–370)
TRIGL SERPL-MCNC: 103 MG/DL (ref 0–149)
VIT B12 SERPL-MCNC: 501 PG/ML (ref 211–911)
WBC # BLD AUTO: 7.3 K/UL (ref 4.8–10.8)

## 2022-01-21 PROCEDURE — 700105 HCHG RX REV CODE 258: Performed by: COLON & RECTAL SURGERY

## 2022-01-21 PROCEDURE — A9270 NON-COVERED ITEM OR SERVICE: HCPCS | Performed by: COLON & RECTAL SURGERY

## 2022-01-21 PROCEDURE — 82728 ASSAY OF FERRITIN: CPT

## 2022-01-21 PROCEDURE — 700101 HCHG RX REV CODE 250: Performed by: PHYSICIAN ASSISTANT

## 2022-01-21 PROCEDURE — 700111 HCHG RX REV CODE 636 W/ 250 OVERRIDE (IP): Performed by: ANESTHESIOLOGY

## 2022-01-21 PROCEDURE — 160036 HCHG PACU - EA ADDL 30 MINS PHASE I: Performed by: COLON & RECTAL SURGERY

## 2022-01-21 PROCEDURE — 160041 HCHG SURGERY MINUTES - EA ADDL 1 MIN LEVEL 4: Performed by: COLON & RECTAL SURGERY

## 2022-01-21 PROCEDURE — 84207 ASSAY OF VITAMIN B-6: CPT

## 2022-01-21 PROCEDURE — 83036 HEMOGLOBIN GLYCOSYLATED A1C: CPT

## 2022-01-21 PROCEDURE — 80053 COMPREHEN METABOLIC PANEL: CPT

## 2022-01-21 PROCEDURE — 700102 HCHG RX REV CODE 250 W/ 637 OVERRIDE(OP): Performed by: COLON & RECTAL SURGERY

## 2022-01-21 PROCEDURE — 501399 HCHG SPECIMAN BAG, ENDO CATC: Performed by: COLON & RECTAL SURGERY

## 2022-01-21 PROCEDURE — 160002 HCHG RECOVERY MINUTES (STAT): Performed by: COLON & RECTAL SURGERY

## 2022-01-21 PROCEDURE — 84466 ASSAY OF TRANSFERRIN: CPT

## 2022-01-21 PROCEDURE — 160009 HCHG ANES TIME/MIN: Performed by: COLON & RECTAL SURGERY

## 2022-01-21 PROCEDURE — 502570 HCHG PACK, GASTRIC BANDING: Performed by: COLON & RECTAL SURGERY

## 2022-01-21 PROCEDURE — 700102 HCHG RX REV CODE 250 W/ 637 OVERRIDE(OP): Performed by: ANESTHESIOLOGY

## 2022-01-21 PROCEDURE — A9270 NON-COVERED ITEM OR SERVICE: HCPCS | Performed by: ANESTHESIOLOGY

## 2022-01-21 PROCEDURE — 502240 HCHG MISC OR SUPPLY RC 0272: Performed by: COLON & RECTAL SURGERY

## 2022-01-21 PROCEDURE — 501838 HCHG SUTURE GENERAL: Performed by: COLON & RECTAL SURGERY

## 2022-01-21 PROCEDURE — 84425 ASSAY OF VITAMIN B-1: CPT

## 2022-01-21 PROCEDURE — 700111 HCHG RX REV CODE 636 W/ 250 OVERRIDE (IP): Performed by: COLON & RECTAL SURGERY

## 2022-01-21 PROCEDURE — 501583 HCHG TROCAR, THRD CAN&SEAL 5X100: Performed by: COLON & RECTAL SURGERY

## 2022-01-21 PROCEDURE — 160035 HCHG PACU - 1ST 60 MINS PHASE I: Performed by: COLON & RECTAL SURGERY

## 2022-01-21 PROCEDURE — 700101 HCHG RX REV CODE 250: Performed by: COLON & RECTAL SURGERY

## 2022-01-21 PROCEDURE — 700101 HCHG RX REV CODE 250: Performed by: ANESTHESIOLOGY

## 2022-01-21 PROCEDURE — 83540 ASSAY OF IRON: CPT

## 2022-01-21 PROCEDURE — 700102 HCHG RX REV CODE 250 W/ 637 OVERRIDE(OP): Performed by: PHYSICIAN ASSISTANT

## 2022-01-21 PROCEDURE — 85027 COMPLETE CBC AUTOMATED: CPT

## 2022-01-21 PROCEDURE — 700111 HCHG RX REV CODE 636 W/ 250 OVERRIDE (IP): Performed by: PHYSICIAN ASSISTANT

## 2022-01-21 PROCEDURE — 501570 HCHG TROCAR, SEPARATOR: Performed by: COLON & RECTAL SURGERY

## 2022-01-21 PROCEDURE — 88307 TISSUE EXAM BY PATHOLOGIST: CPT

## 2022-01-21 PROCEDURE — 160048 HCHG OR STATISTICAL LEVEL 1-5: Performed by: COLON & RECTAL SURGERY

## 2022-01-21 PROCEDURE — 82306 VITAMIN D 25 HYDROXY: CPT

## 2022-01-21 PROCEDURE — 85610 PROTHROMBIN TIME: CPT

## 2022-01-21 PROCEDURE — A9270 NON-COVERED ITEM OR SERVICE: HCPCS | Performed by: PHYSICIAN ASSISTANT

## 2022-01-21 PROCEDURE — 87426 SARSCOV CORONAVIRUS AG IA: CPT | Performed by: COLON & RECTAL SURGERY

## 2022-01-21 PROCEDURE — 82607 VITAMIN B-12: CPT

## 2022-01-21 PROCEDURE — 501497 HCHG SURGICLIP: Performed by: COLON & RECTAL SURGERY

## 2022-01-21 PROCEDURE — 0DB64Z3 EXCISION OF STOMACH, PERCUTANEOUS ENDOSCOPIC APPROACH, VERTICAL: ICD-10-PCS | Performed by: COLON & RECTAL SURGERY

## 2022-01-21 PROCEDURE — 84630 ASSAY OF ZINC: CPT

## 2022-01-21 PROCEDURE — 80061 LIPID PANEL: CPT

## 2022-01-21 PROCEDURE — 84134 ASSAY OF PREALBUMIN: CPT

## 2022-01-21 PROCEDURE — 160029 HCHG SURGERY MINUTES - 1ST 30 MINS LEVEL 4: Performed by: COLON & RECTAL SURGERY

## 2022-01-21 PROCEDURE — 770001 HCHG ROOM/CARE - MED/SURG/GYN PRIV*

## 2022-01-21 RX ORDER — HYDROMORPHONE HYDROCHLORIDE 1 MG/ML
0.4 INJECTION, SOLUTION INTRAMUSCULAR; INTRAVENOUS; SUBCUTANEOUS
Status: DISCONTINUED | OUTPATIENT
Start: 2022-01-21 | End: 2022-01-21 | Stop reason: HOSPADM

## 2022-01-21 RX ORDER — ONDANSETRON 2 MG/ML
INJECTION INTRAMUSCULAR; INTRAVENOUS PRN
Status: DISCONTINUED | OUTPATIENT
Start: 2022-01-21 | End: 2022-01-21 | Stop reason: SURG

## 2022-01-21 RX ORDER — ONDANSETRON 2 MG/ML
4 INJECTION INTRAMUSCULAR; INTRAVENOUS
Status: DISCONTINUED | OUTPATIENT
Start: 2022-01-21 | End: 2022-01-21 | Stop reason: HOSPADM

## 2022-01-21 RX ORDER — HALOPERIDOL 5 MG/ML
1 INJECTION INTRAMUSCULAR
Status: DISCONTINUED | OUTPATIENT
Start: 2022-01-21 | End: 2022-01-21 | Stop reason: HOSPADM

## 2022-01-21 RX ORDER — SODIUM CHLORIDE AND POTASSIUM CHLORIDE 150; 900 MG/100ML; MG/100ML
INJECTION, SOLUTION INTRAVENOUS CONTINUOUS
Status: DISCONTINUED | OUTPATIENT
Start: 2022-01-21 | End: 2022-01-22 | Stop reason: HOSPADM

## 2022-01-21 RX ORDER — ACETAMINOPHEN 10 MG/ML
1000 INJECTION, SOLUTION INTRAVENOUS EVERY 6 HOURS
Status: COMPLETED | OUTPATIENT
Start: 2022-01-21 | End: 2022-01-22

## 2022-01-21 RX ORDER — ACETAMINOPHEN 500 MG
1000 TABLET ORAL EVERY 6 HOURS PRN
Status: DISCONTINUED | OUTPATIENT
Start: 2022-01-26 | End: 2022-01-22 | Stop reason: HOSPADM

## 2022-01-21 RX ORDER — DIPHENHYDRAMINE HYDROCHLORIDE 50 MG/ML
12.5 INJECTION INTRAMUSCULAR; INTRAVENOUS
Status: DISCONTINUED | OUTPATIENT
Start: 2022-01-21 | End: 2022-01-21 | Stop reason: HOSPADM

## 2022-01-21 RX ORDER — ENALAPRILAT 1.25 MG/ML
2.5 INJECTION INTRAVENOUS EVERY 6 HOURS PRN
Status: DISCONTINUED | OUTPATIENT
Start: 2022-01-21 | End: 2022-01-22 | Stop reason: HOSPADM

## 2022-01-21 RX ORDER — DIPHENHYDRAMINE HYDROCHLORIDE 50 MG/ML
12.5 INJECTION INTRAMUSCULAR; INTRAVENOUS EVERY 6 HOURS PRN
Status: DISCONTINUED | OUTPATIENT
Start: 2022-01-21 | End: 2022-01-22 | Stop reason: HOSPADM

## 2022-01-21 RX ORDER — DEXAMETHASONE SODIUM PHOSPHATE 4 MG/ML
INJECTION, SOLUTION INTRA-ARTICULAR; INTRALESIONAL; INTRAMUSCULAR; INTRAVENOUS; SOFT TISSUE PRN
Status: DISCONTINUED | OUTPATIENT
Start: 2022-01-21 | End: 2022-01-21 | Stop reason: SURG

## 2022-01-21 RX ORDER — HALOPERIDOL 5 MG/ML
1 INJECTION INTRAMUSCULAR EVERY 6 HOURS PRN
Status: DISCONTINUED | OUTPATIENT
Start: 2022-01-21 | End: 2022-01-22 | Stop reason: HOSPADM

## 2022-01-21 RX ORDER — HYDROMORPHONE HYDROCHLORIDE 1 MG/ML
0.1 INJECTION, SOLUTION INTRAMUSCULAR; INTRAVENOUS; SUBCUTANEOUS
Status: DISCONTINUED | OUTPATIENT
Start: 2022-01-21 | End: 2022-01-21 | Stop reason: HOSPADM

## 2022-01-21 RX ORDER — OXYCODONE HCL 10 MG/1
10 TABLET, FILM COATED, EXTENDED RELEASE ORAL ONCE
Status: COMPLETED | OUTPATIENT
Start: 2022-01-21 | End: 2022-01-21

## 2022-01-21 RX ORDER — PHENYLEPHRINE HCL IN 0.9% NACL 0.5 MG/5ML
SYRINGE (ML) INTRAVENOUS PRN
Status: DISCONTINUED | OUTPATIENT
Start: 2022-01-21 | End: 2022-01-21 | Stop reason: SURG

## 2022-01-21 RX ORDER — DEXMEDETOMIDINE HYDROCHLORIDE 100 UG/ML
INJECTION, SOLUTION INTRAVENOUS PRN
Status: DISCONTINUED | OUTPATIENT
Start: 2022-01-21 | End: 2022-01-21 | Stop reason: SURG

## 2022-01-21 RX ORDER — BUPIVACAINE HYDROCHLORIDE AND EPINEPHRINE 5; 5 MG/ML; UG/ML
INJECTION, SOLUTION PERINEURAL
Status: DISCONTINUED | OUTPATIENT
Start: 2022-01-21 | End: 2022-01-21 | Stop reason: HOSPADM

## 2022-01-21 RX ORDER — SODIUM CHLORIDE, SODIUM LACTATE, POTASSIUM CHLORIDE, CALCIUM CHLORIDE 600; 310; 30; 20 MG/100ML; MG/100ML; MG/100ML; MG/100ML
INJECTION, SOLUTION INTRAVENOUS CONTINUOUS
Status: ACTIVE | OUTPATIENT
Start: 2022-01-21 | End: 2022-01-21

## 2022-01-21 RX ORDER — GABAPENTIN 300 MG/1
300 CAPSULE ORAL 3 TIMES DAILY
Status: DISCONTINUED | OUTPATIENT
Start: 2022-01-21 | End: 2022-01-22 | Stop reason: HOSPADM

## 2022-01-21 RX ORDER — GABAPENTIN 300 MG/1
300 CAPSULE ORAL ONCE
Status: COMPLETED | OUTPATIENT
Start: 2022-01-21 | End: 2022-01-21

## 2022-01-21 RX ORDER — CALCIUM CARBONATE 500 MG/1
500 TABLET, CHEWABLE ORAL
Status: DISCONTINUED | OUTPATIENT
Start: 2022-01-21 | End: 2022-01-22 | Stop reason: HOSPADM

## 2022-01-21 RX ORDER — SCOLOPAMINE TRANSDERMAL SYSTEM 1 MG/1
1 PATCH, EXTENDED RELEASE TRANSDERMAL ONCE
Status: DISCONTINUED | OUTPATIENT
Start: 2022-01-21 | End: 2022-01-21 | Stop reason: HOSPADM

## 2022-01-21 RX ORDER — OXYCODONE HCL 5 MG/5 ML
10 SOLUTION, ORAL ORAL
Status: DISCONTINUED | OUTPATIENT
Start: 2022-01-21 | End: 2022-01-22 | Stop reason: HOSPADM

## 2022-01-21 RX ORDER — CEFAZOLIN SODIUM 1 G/3ML
INJECTION, POWDER, FOR SOLUTION INTRAMUSCULAR; INTRAVENOUS PRN
Status: DISCONTINUED | OUTPATIENT
Start: 2022-01-21 | End: 2022-01-21 | Stop reason: SURG

## 2022-01-21 RX ORDER — ACETAMINOPHEN 500 MG
1000 TABLET ORAL EVERY 6 HOURS
Status: DISCONTINUED | OUTPATIENT
Start: 2022-01-22 | End: 2022-01-22 | Stop reason: HOSPADM

## 2022-01-21 RX ORDER — SCOLOPAMINE TRANSDERMAL SYSTEM 1 MG/1
1 PATCH, EXTENDED RELEASE TRANSDERMAL
COMMUNITY
Start: 2022-01-18

## 2022-01-21 RX ORDER — HYDROMORPHONE HYDROCHLORIDE 1 MG/ML
0.2 INJECTION, SOLUTION INTRAMUSCULAR; INTRAVENOUS; SUBCUTANEOUS
Status: DISCONTINUED | OUTPATIENT
Start: 2022-01-21 | End: 2022-01-21 | Stop reason: HOSPADM

## 2022-01-21 RX ORDER — SODIUM CHLORIDE, SODIUM LACTATE, POTASSIUM CHLORIDE, AND CALCIUM CHLORIDE .6; .31; .03; .02 G/100ML; G/100ML; G/100ML; G/100ML
500 INJECTION, SOLUTION INTRAVENOUS
Status: DISCONTINUED | OUTPATIENT
Start: 2022-01-21 | End: 2022-01-22 | Stop reason: HOSPADM

## 2022-01-21 RX ORDER — ACETAMINOPHEN 10 MG/ML
1 INJECTION, SOLUTION INTRAVENOUS ONCE
Status: COMPLETED | OUTPATIENT
Start: 2022-01-21 | End: 2022-01-21

## 2022-01-21 RX ORDER — HYDROMORPHONE HYDROCHLORIDE 1 MG/ML
0.5 INJECTION, SOLUTION INTRAMUSCULAR; INTRAVENOUS; SUBCUTANEOUS
Status: DISCONTINUED | OUTPATIENT
Start: 2022-01-21 | End: 2022-01-22 | Stop reason: HOSPADM

## 2022-01-21 RX ORDER — OXYCODONE HCL 5 MG/5 ML
5 SOLUTION, ORAL ORAL
Status: DISCONTINUED | OUTPATIENT
Start: 2022-01-21 | End: 2022-01-22 | Stop reason: HOSPADM

## 2022-01-21 RX ORDER — ONDANSETRON 2 MG/ML
4 INJECTION INTRAMUSCULAR; INTRAVENOUS EVERY 4 HOURS PRN
Status: DISCONTINUED | OUTPATIENT
Start: 2022-01-21 | End: 2022-01-22 | Stop reason: HOSPADM

## 2022-01-21 RX ORDER — PROMETHAZINE HYDROCHLORIDE 25 MG/1
25 SUPPOSITORY RECTAL EVERY 4 HOURS PRN
Status: DISCONTINUED | OUTPATIENT
Start: 2022-01-21 | End: 2022-01-22 | Stop reason: HOSPADM

## 2022-01-21 RX ADMIN — ACETAMINOPHEN 1000 MG: 10 INJECTION, SOLUTION INTRAVENOUS at 17:18

## 2022-01-21 RX ADMIN — HYDROMORPHONE HYDROCHLORIDE 0.4 MG: 1 INJECTION, SOLUTION INTRAMUSCULAR; INTRAVENOUS; SUBCUTANEOUS at 13:30

## 2022-01-21 RX ADMIN — GABAPENTIN 300 MG: 300 CAPSULE ORAL at 17:18

## 2022-01-21 RX ADMIN — FAMOTIDINE 20 MG: 10 INJECTION, SOLUTION INTRAVENOUS at 17:18

## 2022-01-21 RX ADMIN — OXYCODONE HYDROCHLORIDE 10 MG: 5 SOLUTION ORAL at 20:24

## 2022-01-21 RX ADMIN — SUGAMMADEX 200 MG: 100 INJECTION, SOLUTION INTRAVENOUS at 11:56

## 2022-01-21 RX ADMIN — ROCURONIUM BROMIDE 50 MG: 10 INJECTION, SOLUTION INTRAVENOUS at 11:17

## 2022-01-21 RX ADMIN — FENTANYL CITRATE 50 MCG: 50 INJECTION, SOLUTION INTRAMUSCULAR; INTRAVENOUS at 12:26

## 2022-01-21 RX ADMIN — DEXMEDETOMIDINE 50 MCG: 200 INJECTION, SOLUTION INTRAVENOUS at 11:19

## 2022-01-21 RX ADMIN — SODIUM CHLORIDE, POTASSIUM CHLORIDE, SODIUM LACTATE AND CALCIUM CHLORIDE: 600; 310; 30; 20 INJECTION, SOLUTION INTRAVENOUS at 11:13

## 2022-01-21 RX ADMIN — ACETAMINOPHEN 1 G: 10 INJECTION, SOLUTION INTRAVENOUS at 09:53

## 2022-01-21 RX ADMIN — HYDROMORPHONE HYDROCHLORIDE 0.2 MG: 1 INJECTION, SOLUTION INTRAMUSCULAR; INTRAVENOUS; SUBCUTANEOUS at 13:22

## 2022-01-21 RX ADMIN — Medication 100 MCG: at 11:35

## 2022-01-21 RX ADMIN — PROPOFOL 300 MG: 10 INJECTION, EMULSION INTRAVENOUS at 11:17

## 2022-01-21 RX ADMIN — POTASSIUM CHLORIDE AND SODIUM CHLORIDE: 900; 150 INJECTION, SOLUTION INTRAVENOUS at 16:16

## 2022-01-21 RX ADMIN — GABAPENTIN 300 MG: 300 CAPSULE ORAL at 09:49

## 2022-01-21 RX ADMIN — ONDANSETRON 4 MG: 2 INJECTION INTRAMUSCULAR; INTRAVENOUS at 11:51

## 2022-01-21 RX ADMIN — OXYCODONE HYDROCHLORIDE 10 MG: 5 SOLUTION ORAL at 16:21

## 2022-01-21 RX ADMIN — DEXAMETHASONE SODIUM PHOSPHATE 4 MG: 4 INJECTION, SOLUTION INTRA-ARTICULAR; INTRALESIONAL; INTRAMUSCULAR; INTRAVENOUS; SOFT TISSUE at 11:46

## 2022-01-21 RX ADMIN — HYDROCODONE BITARTRATE AND ACETAMINOPHEN 7.5 MG: 7.5; 325 SOLUTION ORAL at 12:43

## 2022-01-21 RX ADMIN — SODIUM CHLORIDE, POTASSIUM CHLORIDE, SODIUM LACTATE AND CALCIUM CHLORIDE: 600; 310; 30; 20 INJECTION, SOLUTION INTRAVENOUS at 09:53

## 2022-01-21 RX ADMIN — HYDROMORPHONE HYDROCHLORIDE 0.4 MG: 1 INJECTION, SOLUTION INTRAMUSCULAR; INTRAVENOUS; SUBCUTANEOUS at 14:00

## 2022-01-21 RX ADMIN — FENTANYL CITRATE 50 MCG: 50 INJECTION, SOLUTION INTRAMUSCULAR; INTRAVENOUS at 12:11

## 2022-01-21 RX ADMIN — FENTANYL CITRATE 50 MCG: 50 INJECTION, SOLUTION INTRAMUSCULAR; INTRAVENOUS at 12:45

## 2022-01-21 RX ADMIN — FENTANYL CITRATE 50 MCG: 50 INJECTION, SOLUTION INTRAMUSCULAR; INTRAVENOUS at 13:09

## 2022-01-21 RX ADMIN — CEFAZOLIN 2 G: 330 INJECTION, POWDER, FOR SOLUTION INTRAMUSCULAR; INTRAVENOUS at 11:13

## 2022-01-21 RX ADMIN — OXYCODONE HYDROCHLORIDE 10 MG: 10 TABLET, FILM COATED, EXTENDED RELEASE ORAL at 09:49

## 2022-01-21 ASSESSMENT — PAIN DESCRIPTION - PAIN TYPE
TYPE: SURGICAL PAIN

## 2022-01-21 NOTE — OR NURSING
Called Dhaval Patino to follow up on missing lab order results.  Per Dhaval Patino: the most recent labs pt. has are from 12/21/21.  Called pt. To follow up on missing labs.  Patient reported she had attempted to ask several times and was told there were no orders.  Called Dr. Ramirez's surgery scheduled; spoke with Shahnaz.  Per Shahnaz: It is ok for patient to get labs done on the day of surgery.  Called patient to notify and instructed pt. that if she has clear liquids tomorrow morning prior to surgery she could only drink water due to lipid panel order.  Patient stated verbal understanding.

## 2022-01-21 NOTE — ANESTHESIA PROCEDURE NOTES
Airway    Date/Time: 1/21/2022 11:17 AM  Performed by: Naveed Worthy M.D.  Authorized by: Naveed Worthy M.D.     Location:  OR  Urgency:  Elective  Indications for Airway Management:  Anesthesia      Spontaneous Ventilation: absent    Sedation Level:  Deep  Preoxygenated: Yes    Patient Position:  Sniffing  Final Airway Type:  Endotracheal airway  Final Endotracheal Airway:  ETT  Cuffed: Yes    Technique Used for Successful ETT Placement:  Direct laryngoscopy    Insertion Site:  Oral  Blade Type:  Landis  Laryngoscope Blade/Videolaryngoscope Blade Size:  3  ETT Size (mm):  7.0  Measured from:  Teeth  ETT to Teeth (cm):  22  Placement Verified by: auscultation and capnometry    Cormack-Lehane Classification:  Grade I - full view of glottis  Number of Attempts at Approach:  1

## 2022-01-21 NOTE — PROGRESS NOTES
Bedside report received, assessment completed    A&O x  4, pt calls appropriately  Mobility: Up with SBA  Fall Risk Assessment: no, bed alarm n/a  Pain Assessment: 3/10, medication provided per MAR  Diet: CLD, bariatric   LDA:   IV Access: 20 R-hand , CDI/ flushed/ Infusing NS20K @ 150mL  + void, + flatus, 1/21 BM  DVT Prophylaxis: lovenox, SCD +    Procedures:    - 1/21 Gastric Sleeve   D/C Plan: home pending surgical clearance     Reviewed plan of care with patient, bed in lowest position and locked, pt resting comfortably now, call light within reach, all needs met at this time. Interventions will be executed per plan of care

## 2022-01-21 NOTE — ANESTHESIA PREPROCEDURE EVALUATION
Case: [id number] Date/Time: 01/21/22 1045    Procedure: GASTRECTOMY, SLEEVE, LAPAROSCOPIC    Pre-op diagnosis: MORBID OBESITY    Location: [place] [address] / SURGERY [facility]    Surgeons: Hugh Ramirez M.D.          Relevant Problems   No relevant active problems   morbid obesity    Physical Exam    Airway   Mallampati: II  TM distance: >3 FB  Neck ROM: full       Cardiovascular - normal exam  Rhythm: regular  Rate: normal  (-) murmur     Dental - normal exam           Pulmonary - normal exam  Breath sounds clear to auscultation     Abdominal    Neurological - normal exam                 Anesthesia Plan    ASA 3   ASA physical status 3 criteria: morbid obesity - BMI greater than or equal to 40    Plan - general       Airway plan will be ETT          Induction: intravenous      Pertinent diagnostic labs and testing reviewed    Informed Consent:    Anesthetic plan and risks discussed with patient.

## 2022-01-21 NOTE — CARE PLAN
The patient is Stable - Low risk of patient condition declining or worsening    Shift Goals  Clinical Goals: Pain/ Diet mgt  Patient Goals: Pain/ Rest    Progress made toward(s) clinical / shift goals:  pt educated on fall risk, and mobility. Verbalized understanding, will call with questions or concerns. Call light and personal items within reach.       Problem: Knowledge Deficit - Standard  Goal: Patient and family/care givers will demonstrate understanding of plan of care, disease process/condition, diagnostic tests and medications  Outcome: Progressing     Problem: Discharge Barriers/Planning  Goal: Patient's continuum of care needs are met  Outcome: Progressing     Problem: Urinary Elimination  Goal: Establish and maintain regular urinary output  Outcome: Progressing     Problem: Gastrointestinal Irritability  Goal: Nausea and vomiting will be absent or improve  Outcome: Progressing

## 2022-01-21 NOTE — PROGRESS NOTES
All bony prominences assessed, CDI  Lap sites x4 w/ band-aid and steri-strips CDI  No lesions appreciated upon assessment

## 2022-01-21 NOTE — OR NURSING
Arrived to PACU in stable condition. VSS, Medicated with IV and oral pain medications. Belongings brought to bedside. 1 clear bag and one pink bag.

## 2022-01-21 NOTE — ANESTHESIA POSTPROCEDURE EVALUATION
Patient: Sarah Nelson    Procedure Summary     Date: 01/21/22 Room / Location: [place] [address] / SURGERY [facility]    Anesthesia Start: 1113 Anesthesia Stop: 1213    Procedure: GASTRECTOMY, SLEEVE, LAPAROSCOPIC (N/A Abdomen) Diagnosis: (MORBID OBESITY)    Surgeons: Hugh Ramirez M.D. Responsible Provider: Naveed Worthy M.D.    Anesthesia Type: general ASA Status: 3          Final Anesthesia Type: general  Last vitals  BP   Blood Pressure: 100/52    Temp   36.2 °C (97.1 °F)    Pulse   66   Resp   16    SpO2   97 %      Anesthesia Post Evaluation    Patient location during evaluation: PACU  Patient participation: complete - patient participated  Level of consciousness: awake and alert    Airway patency: patent  Anesthetic complications: no  Cardiovascular status: hemodynamically stable  Respiratory status: acceptable  Hydration status: euvolemic    PONV: none          There were no known complications for this encounter.     Nurse Pain Score: 6 (NPRS)

## 2022-01-21 NOTE — ANESTHESIA TIME REPORT
Anesthesia Start and Stop Event Times     Date Time Event    1/21/2022 1108 Ready for Procedure     1113 Anesthesia Start     1213 Anesthesia Stop        Responsible Staff  01/21/22    Name Role Begin End    Naveed Worthy M.D. Anesth 1113 1213        Preop Diagnosis (Free Text):  Pre-op Diagnosis     MORBID OBESITY        Preop Diagnosis (Codes):    Premium Reason  Non-Premium    Comments:

## 2022-01-21 NOTE — OP REPORT
NAME:  Sarah Nelson  MRN:  [id number]  :  1980      DATE OF OPERATION: 2022    PREOPERATIVE DIAGNOSIS: Morbid Obesity with medical sequelae    POSTOPERATIVE DIAGNOSIS: Morbid Obesity with medical sequelae    OPERATION PERFORMED: 1.  Laparoscopic Sleeve Gastrectomy    SURGEON: Hugh Ramirez MD    ASSISTANT:  CURRY Henderson PA-C, PA-C    ANESTHESIOLOGIST:  Anesthesiologist: Naveed Worthy M.D.    ANESTHESIA: General endotracheal anesthesia.     SPECIMEN: Stomach    ESTIMATED BLOOD LOSS: <10cc.     INDICATIONS: The patient is a 41 y.o. female with a diagnosis of morbid obesity with medical sequelae. She is taken to the operating room today for Laparoscopic Sleeve Gastrectomy.     PROCEDURE: Following informed consent, the patient was properly identified, taken to the operating room, and placed in the supine position where general endotracheal anesthesia was administered. Intravenous antibiotics were administered by the anesthesiologist in the correct time interval. Sequential compression devices were employed. The abdomen was prepped and draped into a sterile field.     An optical entry bladeless  trocar was utilized and pneumoperitoneum carefully established in the usual fashion.  The bladeless 5 mm separator trocar was introduced and the 5 mm lens/camera was passed into the peritoneal cavity.  Three additional separator trocars were placed under direct vision.  A 5 mm Leatha-type liver retractor was placed into position.  This was used to elevate the left sided segment of the liver.  It was secured to the patients right side with a robot arm.  Careful inspection revealed no untoward events with placement of the trocars.    The gastrocolic omentum was examined and dissected with the ligasure device and a point on the distal antrum was selected to begin the sleeve gastrectomy.  A 40 Amharic bougie was then passed down into the antrum.  A careful inspection at the hiatus demonstrated no significant hiatal  hernia which would require repair or risk significant reflux. A echelon linear stapler with a thick-tissue cartridges, was employed to divide partway across the stomach.  With the bougie in position, the stapler was then used to march proximally along the stomach and transsection of the stomach was performed, beginning 5 cm proximal to the pylorus in the method of the sleeve gastrectomy.  The greater curvature aspect of the stomach was then dissected and the greater curvature vessels and short gastric vessels were divided with the ligasure.      The last endomechanical stapler firings were used to complete the transection of the stomach.  Hemoclips were used if any site exhibited oozing. Careful inspection of the staple line demonstrated excellent, meticulous hemostasis and a completely intact staple line with seemless tissue approximation.  Seromuscular sutures were placed using polysorb suture to further secure the staple line.  The liver exhibited mild hepatic steatosis.  The bougie was then removed.     The large endocatch bag was then used to retrieve the stomach specimen.  Tisseal fibrin glue sealant was sprayed along the entire staple line. The ports were removed under direct vision and the pneumoperitoneum was allowed to escape. The fascia of this port was closed with 0-Vicryl suture.  The port sites were then irrigated well.  The port site skin incisions were closed with interrupted 4-0 Vicryl subcuticular sutures.  Steri-Strips and Benzoin were applied beneath sterile Band-Aids.     The patient tolerated the procedure well and there were no apparent complications. All sponge, needle, and instrument counts were correct on 2 separate occasions. She was awakened, extubated, and transferred to the recovery room in satisfactory condition.       ____________________________________   Hugh Ramirez MD  DD: 1/21/2022  12:03 PM    CC:  [facility];

## 2022-01-22 VITALS
SYSTOLIC BLOOD PRESSURE: 103 MMHG | OXYGEN SATURATION: 93 % | DIASTOLIC BLOOD PRESSURE: 70 MMHG | RESPIRATION RATE: 16 BRPM | TEMPERATURE: 98.6 F | BODY MASS INDEX: 40.57 KG/M2 | WEIGHT: 237.66 LBS | HEIGHT: 64 IN | HEART RATE: 93 BPM

## 2022-01-22 LAB
ALBUMIN SERPL BCP-MCNC: 4.3 G/DL (ref 3.2–4.9)
ALBUMIN/GLOB SERPL: 1.9 G/DL
ALP SERPL-CCNC: 29 U/L (ref 30–99)
ALT SERPL-CCNC: 20 U/L (ref 2–50)
ANION GAP SERPL CALC-SCNC: 12 MMOL/L (ref 7–16)
AST SERPL-CCNC: 23 U/L (ref 12–45)
BILIRUB SERPL-MCNC: 0.4 MG/DL (ref 0.1–1.5)
BUN SERPL-MCNC: 9 MG/DL (ref 8–22)
CALCIUM SERPL-MCNC: 8.8 MG/DL (ref 8.5–10.5)
CHLORIDE SERPL-SCNC: 103 MMOL/L (ref 96–112)
CO2 SERPL-SCNC: 21 MMOL/L (ref 20–33)
CREAT SERPL-MCNC: 0.54 MG/DL (ref 0.5–1.4)
ERYTHROCYTE [DISTWIDTH] IN BLOOD BY AUTOMATED COUNT: 39.3 FL (ref 35.9–50)
GLOBULIN SER CALC-MCNC: 2.3 G/DL (ref 1.9–3.5)
GLUCOSE SERPL-MCNC: 123 MG/DL (ref 65–99)
HCT VFR BLD AUTO: 38 % (ref 37–47)
HGB BLD-MCNC: 13.2 G/DL (ref 12–16)
MCH RBC QN AUTO: 31.8 PG (ref 27–33)
MCHC RBC AUTO-ENTMCNC: 34.7 G/DL (ref 33.6–35)
MCV RBC AUTO: 91.6 FL (ref 81.4–97.8)
PLATELET # BLD AUTO: 219 K/UL (ref 164–446)
PMV BLD AUTO: 10.6 FL (ref 9–12.9)
POTASSIUM SERPL-SCNC: 4.3 MMOL/L (ref 3.6–5.5)
PROT SERPL-MCNC: 6.6 G/DL (ref 6–8.2)
RBC # BLD AUTO: 4.15 M/UL (ref 4.2–5.4)
SODIUM SERPL-SCNC: 136 MMOL/L (ref 135–145)
WBC # BLD AUTO: 15.2 K/UL (ref 4.8–10.8)

## 2022-01-22 PROCEDURE — 700102 HCHG RX REV CODE 250 W/ 637 OVERRIDE(OP): Performed by: PHYSICIAN ASSISTANT

## 2022-01-22 PROCEDURE — A9270 NON-COVERED ITEM OR SERVICE: HCPCS | Performed by: PHYSICIAN ASSISTANT

## 2022-01-22 PROCEDURE — 36415 COLL VENOUS BLD VENIPUNCTURE: CPT

## 2022-01-22 PROCEDURE — 700111 HCHG RX REV CODE 636 W/ 250 OVERRIDE (IP): Performed by: PHYSICIAN ASSISTANT

## 2022-01-22 PROCEDURE — 85027 COMPLETE CBC AUTOMATED: CPT

## 2022-01-22 PROCEDURE — 700101 HCHG RX REV CODE 250: Performed by: PHYSICIAN ASSISTANT

## 2022-01-22 PROCEDURE — 80053 COMPREHEN METABOLIC PANEL: CPT

## 2022-01-22 PROCEDURE — 700101 HCHG RX REV CODE 250: Performed by: STUDENT IN AN ORGANIZED HEALTH CARE EDUCATION/TRAINING PROGRAM

## 2022-01-22 RX ADMIN — OXYCODONE HYDROCHLORIDE 10 MG: 5 SOLUTION ORAL at 02:44

## 2022-01-22 RX ADMIN — ONDANSETRON 4 MG: 2 INJECTION INTRAMUSCULAR; INTRAVENOUS at 09:18

## 2022-01-22 RX ADMIN — ENOXAPARIN SODIUM 40 MG: 40 INJECTION SUBCUTANEOUS at 05:30

## 2022-01-22 RX ADMIN — ONDANSETRON 4 MG: 2 INJECTION INTRAMUSCULAR; INTRAVENOUS at 02:44

## 2022-01-22 RX ADMIN — GABAPENTIN 300 MG: 300 CAPSULE ORAL at 05:28

## 2022-01-22 RX ADMIN — ACETAMINOPHEN 1000 MG: 500 TABLET ORAL at 06:20

## 2022-01-22 RX ADMIN — OXYCODONE HYDROCHLORIDE 10 MG: 5 SOLUTION ORAL at 12:25

## 2022-01-22 RX ADMIN — OXYCODONE HYDROCHLORIDE 10 MG: 5 SOLUTION ORAL at 05:46

## 2022-01-22 RX ADMIN — GABAPENTIN 300 MG: 300 CAPSULE ORAL at 12:25

## 2022-01-22 RX ADMIN — POTASSIUM CHLORIDE AND SODIUM CHLORIDE: 900; 150 INJECTION, SOLUTION INTRAVENOUS at 01:50

## 2022-01-22 RX ADMIN — ACETAMINOPHEN 1000 MG: 500 TABLET ORAL at 12:23

## 2022-01-22 RX ADMIN — FAMOTIDINE 20 MG: 10 INJECTION, SOLUTION INTRAVENOUS at 05:29

## 2022-01-22 RX ADMIN — ACETAMINOPHEN 1000 MG: 10 INJECTION, SOLUTION INTRAVENOUS at 00:07

## 2022-01-22 RX ADMIN — POTASSIUM CHLORIDE AND SODIUM CHLORIDE: 900; 150 INJECTION, SOLUTION INTRAVENOUS at 09:23

## 2022-01-22 ASSESSMENT — COGNITIVE AND FUNCTIONAL STATUS - GENERAL
DRESSING REGULAR LOWER BODY CLOTHING: A LITTLE
SUGGESTED CMS G CODE MODIFIER MOBILITY: CI
DAILY ACTIVITIY SCORE: 22
CLIMB 3 TO 5 STEPS WITH RAILING: A LITTLE
MOBILITY SCORE: 23
SUGGESTED CMS G CODE MODIFIER DAILY ACTIVITY: CJ
HELP NEEDED FOR BATHING: A LITTLE

## 2022-01-22 ASSESSMENT — LIFESTYLE VARIABLES
TOTAL SCORE: 0
ALCOHOL_USE: NO
CONSUMPTION TOTAL: NEGATIVE
EVER FELT BAD OR GUILTY ABOUT YOUR DRINKING: NO
TOTAL SCORE: 0
DOES PATIENT WANT TO STOP DRINKING: NO
HAVE YOU EVER FELT YOU SHOULD CUT DOWN ON YOUR DRINKING: NO
HAVE PEOPLE ANNOYED YOU BY CRITICIZING YOUR DRINKING: NO
ON A TYPICAL DAY WHEN YOU DRINK ALCOHOL HOW MANY DRINKS DO YOU HAVE: 0
AVERAGE NUMBER OF DAYS PER WEEK YOU HAVE A DRINK CONTAINING ALCOHOL: 0
EVER HAD A DRINK FIRST THING IN THE MORNING TO STEADY YOUR NERVES TO GET RID OF A HANGOVER: NO
HOW MANY TIMES IN THE PAST YEAR HAVE YOU HAD 5 OR MORE DRINKS IN A DAY: 0
TOTAL SCORE: 0

## 2022-01-22 ASSESSMENT — PAIN DESCRIPTION - PAIN TYPE
TYPE: SURGICAL PAIN
TYPE: SURGICAL PAIN
TYPE: ACUTE PAIN;SURGICAL PAIN

## 2022-01-22 ASSESSMENT — ENCOUNTER SYMPTOMS
COUGH: 0
PALPITATIONS: 0
VOMITING: 0
NAUSEA: 0
CHILLS: 0
HEARTBURN: 0
ABDOMINAL PAIN: 1
SHORTNESS OF BREATH: 0
FEVER: 0
DIARRHEA: 0

## 2022-01-22 ASSESSMENT — PATIENT HEALTH QUESTIONNAIRE - PHQ9
2. FEELING DOWN, DEPRESSED, IRRITABLE, OR HOPELESS: NOT AT ALL
SUM OF ALL RESPONSES TO PHQ9 QUESTIONS 1 AND 2: 0
1. LITTLE INTEREST OR PLEASURE IN DOING THINGS: NOT AT ALL

## 2022-01-22 NOTE — CARE PLAN
Problem: Pain - Standard  Goal: Alleviation of pain or a reduction in pain to the patient’s comfort goal  Outcome: Progressing     Problem: Knowledge Deficit - Standard  Goal: Patient and family/care givers will demonstrate understanding of plan of care, disease process/condition, diagnostic tests and medications  Outcome: Progressing     Problem: Urinary Elimination  Goal: Establish and maintain regular urinary output  Outcome: Progressing       The patient is Stable - Low risk of patient condition declining or worsening    Shift Goals  Clinical Goals: pain control  Patient Goals: Pain/ Rest    Progress made toward(s) clinical / shift goals: POC discussed with pt. Pt pain managed with prn medication per MAR. Pt voiding in toilet. Pt up self to the bathroom.

## 2022-01-22 NOTE — PROGRESS NOTES
- IV removed CDI  - Personal belongings and transportation at bedside  - Discharge education provided   - Home with no needs

## 2022-01-22 NOTE — PROGRESS NOTES
Bedside report received.  Assessment complete.  A&O x 4. Patient calls appropriately.  Patient ambulates with standby assist. Bed alarm off.   Patient has 8/10 pain. Pain managed with prn medication per MAR.  x4 lap sites with steri strips and bandaids.  Tolerating clear bariatric diet. Denies N/V.  + void. Last BM PTA (1/21).  SCD's on.  Reviewed plan of care with patient. Call light and personal belongings within reach. Hourly rounding in place. All needs met at this time.

## 2022-01-22 NOTE — DISCHARGE INSTRUCTIONS
Discharge Instructions    Discharged to home by car with relative. Discharged via wheelchair, hospital escort: Yes.  Special equipment needed: Not Applicable    Be sure to schedule a follow-up appointment with your primary care doctor or any specialists as instructed.     Discharge Plan:   Diet Plan: Discussed  Activity Level: Discussed  Confirmed Follow up Appointment: Patient to Call and Schedule Appointment  Confirmed Symptoms Management: Discussed  Medication Reconciliation Updated: Yes  Influenza Vaccine Indication: Patient Refuses    I understand that a diet low in cholesterol, fat, and sodium is recommended for good health. Unless I have been given specific instructions below for another diet, I accept this instruction as my diet prescription.   Other diet: Clear Liquid Diet    Special Instructions: None    · Is patient discharged on Warfarin / Coumadin?   No     Depression / Suicide Risk    As [facility], it is important to learn how to keep safe from harming yourself.    Recognize the warning signs:  · Abrupt changes in personality, positive or negative- including increase in energy   · Giving away possessions  · Change in eating patterns- significant weight changes-  positive or negative  · Change in sleeping patterns- unable to sleep or sleeping all the time   · Unwillingness or inability to communicate  · Depression  · Unusual sadness, discouragement and loneliness  · Talk of wanting to die  · Neglect of personal appearance   · Rebelliousness- reckless behavior  · Withdrawal from people/activities they love  · Confusion- inability to concentrate     If you or a loved one observes any of these behaviors or has concerns about self-harm, here's what you can do:  · Talk about it- your feelings and reasons for harming yourself  · Remove any means that you might use to hurt yourself (examples: pills, rope, extension cords, firearm)  · Get professional help from the  community (Mental Health, Substance Abuse, psychological counseling)  · Do not be alone:Call your Safe Contact- someone whom you trust who will be there for you.  · Call your local CRISIS HOTLINE 140-1911 or 704-474-3528  · Call your local Children's Mobile Crisis Response Team [place] (139) 534-7691 or www.Neurescue  · Call the toll free National Suicide Prevention Hotlines   · National Suicide Prevention Lifeline 233-363-URAB (9008)  · National Hope Line Network 800-SUICIDE (346-1406)    Bariatric Surgery, Care After  Refer to this sheet in the next few weeks. These instructions provide you with information on caring for yourself after your procedure. Your health care provider may also give you more specific instructions. Your treatment has been planned according to current medical practices, but problems sometimes occur. Call your health care provider if you have any problems or questions after your procedure.  WHAT TO EXPECT AFTER THE PROCEDURE  After your procedure, it is typical to have the following sensations:  · Soreness.  · Sluggishness.  · Tiredness.  · Moodiness.  · Chilliness.  It is not unusual to have dry skin and some hair loss after bariatric surgery.  HOME CARE INSTRUCTIONS  · Do not drink alcohol, use public transportation, or sign important papers for at least 1 day following surgery.  · Do not resume physical activities or drive until directed by your surgeon.  · Avoid lifting anything over 10 pounds (4.5 kg) for 6 weeks following surgery, or until approved by your surgeon.    · Only take over-the-counter or prescription medicines for pain, discomfort, or fever as directed by your surgeon.    · Resume your diet as directed by your surgeon.  You will resume eating with liquids and pureed foods, then soft foods, and progress to a more normal diet over time. Follow your surgeon or dietitian's guidelines for what and how much to eat and drink. You will need to eat slowly, so as not to  cause discomfort and vomiting.  · Use showers for bathing as directed by your surgeon.    · Change dressings as directed by your surgeon.  · Schedule a follow-up appointment with your surgeon as directed.  SEEK MEDICAL CARE IF:   · There is redness, swelling, or increasing pain in the wound.    · There is pus coming from the wound.    · There is drainage from the wound lasting longer than 1 day.    · An unexplained oral temperature above 102°F (38.9°C) develops.    · You notice a foul smell coming from the wound or dressing.    · There is a breaking open of the wound (edges not staying together) after stitches have been removed.    · You notice increasing pain in the shoulder-strap areas.    · You develop episodes of dizziness or faint while standing.    · You develop shortness of breath.    · You develop persistent nausea or vomiting.    · Your soreness seems to be getting worse rather than better.    SEEK IMMEDIATE MEDICAL CARE IF:   · You develop a rash.    · You have difficulty breathing.    · You develop, or feel you are developing, any reaction or side effects to medicines.  FOR MORE INFORMATION  American Society for Bariatric Surgery: www.asbs.org  Weight-control Information Network (WIN): win.niddk.nih.gov     This information is not intended to replace advice given to you by your health care provider. Make sure you discuss any questions you have with your health care provider.     Document Released: 12/18/2006 Document Revised: 01/08/2016 Document Reviewed: 06/18/2014  Elsevier Interactive Patient Education ©2016 [employer]    Dumping Syndrome  Dumping syndrome is a combination of symptoms that occur when food passes through the stomach too quickly. This is most often caused by weight loss (bariatric) or stomach (gastric) surgery. There are two types of dumping syndrome, and some people have both types:  · Early dumping syndrome causes symptoms that occur soon after eating (10-30 minutes).  · Late  dumping syndrome causes symptoms that occur a few hours after eating (2-3 hours).  When you eat and drink, your stomach needs time to digest everything. With dumping syndrome, undigested food and stomach fluids move too quickly into the small intestine, causing symptoms of early dumping syndrome. In other cases, large amounts of sugar move into the small intestine, which causes the pancreas to release too much of the hormone insulin. As a result, your blood sugar can get too low (hypoglycemia), and you may get symptoms of late dumping syndrome.  What are the causes?  · Early dumping syndrome is caused by large amounts of undigested food and liquids passing into the upper part of your small intestine.  · Late dumping syndrome is caused by large amounts of sugar from your diet moving into your small intestine.  What increases the risk?  Certain types of stomach surgery can lead to dumping syndrome. You may be at risk for dumping syndrome if you have had:  · Surgery for ulcers or stomach cancer.  · Weight loss surgery (bariatric surgery).  · Surgery to treat severe heartburn (gastroesophageal reflux).  What are the signs or symptoms?  Early dumping syndrome  Symptoms of early dumping syndrome may include:  · Nausea.  · Fullness or bloating.  · Cramps.  · Vomiting.  · Feeling flushed.  · Sweating.  · Feeling dizzy.  · A fast or irregular heartbeat (palpitations).  · Diarrhea.  · Headache.  Late dumping syndrome  Symptoms of late dumping syndrome may include:  · Dizziness.  · Weakness.  · Sweating.  · Palpitations.  · Hunger.  · Confusion.  How is this diagnosed?  This condition may be diagnosed based on:  · Your symptoms and medical history.  · A physical exam.  · Tests, such as:  ? A modified glucose tolerance test. This blood test measures insulin secretion after you have a certain type of sugary (glucose) drink.  ? Gastric emptying test. This test involves eating a bland meal that includes a material that shows up  easily on an X-ray. After eating the meal, you have X-rays to find out how long it takes the stomach to empty.  ? Upper endoscopy. In this exam, a health care provider puts a flexible telescope (endoscope) through your mouth and throat to check the inside of your stomach.  How is this treated?  For most people, this condition can be managed with diet changes. You may need to change your diet, nutrition, and eating habits. Working with a dietitian can be helpful. Other treatments may include:  · Medicine that slows down stomach emptying and reduces insulin secretion. This medicine may be given by injection.  · Medicine that slows down the digestion of sugar. This medicine is usually used to treat type 2 diabetes. Sometimes it can also relieve symptoms of late dumping syndrome.  · Surgery. For severe cases of dumping syndrome, reconstructive stomach surgery may be needed if other treatments are not working.  Follow these instructions at home:  Eating strategies  · Avoid drinking liquids for 30 minutes before meals.  · Avoid drinking liquids for at least 30 minutes after meals.  · Lie down for 15-30 minutes after meals. This will help to prevent light-headedness and will slow down the emptying of your stomach.  · Eat smaller portions of food. Cut food into small pieces and chew thoroughly before you swallow.  · Work with a dietitian to change your diet and maintain good nutrition.  What to eat and drink    · Try to make sure that at least half of your daily servings of grains come from whole grains. These foods are high in fiber, which helps to slow down digestion.  · Eat more complex carbohydrates, such as whole grains, vegetables, and beans.  · Avoid eating simple carbohydrates and foods that have added sugar, such as fruit juices, white bread, pastries, and sweets. Foods that are high in sugar are especially likely to bring about symptoms.  · With each meal and snack, eat one food that is high in protein.  · Avoid  dairy products if they aggravate your symptoms. Try lactose-free dairy products instead.  · Avoid drinking alcohol.  · Take a fiber supplement if recommended by your health care provider.  · Increase the thickness of foods by adding plant-based thickening agents, such as pectin.  · Avoid eating very hot or very cold foods. They may aggravate your symptoms.  General instructions  · Take over-the-counter and prescription medicines only as told by your health care provider.  · Keep all follow-up visits as told by your health care provider. This is important.  Contact a health care provider if you:  · Notice that your symptoms change or get worse.  · Find that diet changes do not help your symptoms.  · Have trouble maintaining a healthy weight.  Get help right away if you:  · Become dehydrated.  · Have severe dizziness.  · Notice that your heartbeat becomes very irregular.  Summary  · Dumping syndrome is a combination of symptoms that occur when food passes through the stomach too quickly.  · Symptoms may include nausea, vomiting, diarrhea, dizziness, weakness, or hunger.  · This condition is often treated with dietary changes and medicines.  · Work with a dietitian to change your diet and maintain good nutrition.  This information is not intended to replace advice given to you by your health care provider. Make sure you discuss any questions you have with your health care provider.  Document Released: 11/10/2005 Document Revised: 03/18/2019 Document Reviewed: 10/23/2018  Elsevier Patient Education © 2020 Elsevier Inc.

## 2022-01-22 NOTE — PROGRESS NOTES
Surgical Progress Note    Author: June Lewis P.A.-C. Date & Time created: 2022   9:57 AM     Interval Events:  41 year old female sp laparascopic sleeve gastrectomy.   Tolerating clears without nausea/vomiting. Admits to typical incisional abdominal pain, controlled with medication. Pt is ambulating and voiding.   Review of Systems   Constitutional: Negative for chills and fever.   Respiratory: Negative for cough and shortness of breath.    Cardiovascular: Negative for chest pain and palpitations.   Gastrointestinal: Positive for abdominal pain (incisional). Negative for diarrhea, heartburn, nausea and vomiting.   Genitourinary: Negative for dysuria.     Hemodynamics:  Temp (24hrs), Av.8 °C (98.3 °F), Min:36.2 °C (97.1 °F), Max:37.6 °C (99.7 °F)  Temperature: 37 °C (98.6 °F)  Pulse  Av.1  Min: 53  Max: 100   Blood Pressure: 103/70     Respiratory:    Respiration: 16, Pulse Oximetry: 93 %           Neuro:  GCS       Fluids:    Intake/Output Summary (Last 24 hours) at 2022 0957  Last data filed at 2022 0400  Gross per 24 hour   Intake 2760 ml   Output --   Net 2760 ml        Current Diet Order   Procedures   • Diet Order Diet: Clear Liquid (Sips with meds and ice chips okay on the day of surgery. ); Miscellaneous modifications: (optional): Bariatric     Physical Exam  Constitutional:       Appearance: Normal appearance. She is obese.   HENT:      Head: Normocephalic and atraumatic.      Nose: Nose normal.      Mouth/Throat:      Mouth: Mucous membranes are moist.   Eyes:      Conjunctiva/sclera: Conjunctivae normal.   Cardiovascular:      Rate and Rhythm: Normal rate and regular rhythm.   Pulmonary:      Effort: Pulmonary effort is normal. No respiratory distress.   Abdominal:      General: There is distension.      Palpations: Abdomen is soft.      Tenderness: There is abdominal tenderness. There is no guarding or rebound.   Musculoskeletal:         General: Normal range of motion.       Cervical back: Normal range of motion.   Skin:     General: Skin is warm and dry.      Coloration: Skin is not jaundiced.      Findings: No erythema or rash.   Neurological:      Mental Status: She is alert and oriented to person, place, and time.   Psychiatric:         Mood and Affect: Mood normal.       Labs:  Recent Results (from the past 24 hour(s))   Comp Metabolic Panel    Collection Time: 01/21/22 10:00 AM   Result Value Ref Range    Sodium 137 135 - 145 mmol/L    Potassium 4.4 3.6 - 5.5 mmol/L    Chloride 104 96 - 112 mmol/L    Co2 18 (L) 20 - 33 mmol/L    Anion Gap 15.0 7.0 - 16.0    Glucose 88 65 - 99 mg/dL    Bun 15 8 - 22 mg/dL    Creatinine 0.66 0.50 - 1.40 mg/dL    Calcium 9.5 8.5 - 10.5 mg/dL    AST(SGOT) 31 12 - 45 U/L    ALT(SGPT) 23 2 - 50 U/L    Alkaline Phosphatase 32 30 - 99 U/L    Total Bilirubin 0.5 0.1 - 1.5 mg/dL    Albumin 4.7 3.2 - 4.9 g/dL    Total Protein 7.5 6.0 - 8.2 g/dL    Globulin 2.8 1.9 - 3.5 g/dL    A-G Ratio 1.7 g/dL   CBC Without Differential    Collection Time: 01/21/22 10:00 AM   Result Value Ref Range    WBC 7.3 4.8 - 10.8 K/uL    RBC 4.71 4.20 - 5.40 M/uL    Hemoglobin 14.8 12.0 - 16.0 g/dL    Hematocrit 41.8 37.0 - 47.0 %    MCV 88.7 81.4 - 97.8 fL    MCH 31.4 27.0 - 33.0 pg    MCHC 35.4 (H) 33.6 - 35.0 g/dL    RDW 36.9 35.9 - 50.0 fL    Platelet Count 250 164 - 446 K/uL    MPV 10.8 9.0 - 12.9 fL   HEMOGLOBIN A1C    Collection Time: 01/21/22 10:00 AM   Result Value Ref Range    Glycohemoglobin 6.0 (H) 4.0 - 5.6 %    Est Avg Glucose 126 mg/dL   Lipid Profile    Collection Time: 01/21/22 10:00 AM   Result Value Ref Range    Cholesterol,Tot 160 100 - 199 mg/dL    Triglycerides 103 0 - 149 mg/dL    HDL 38 (A) >=40 mg/dL     (H) <100 mg/dL   IRON    Collection Time: 01/21/22 10:00 AM   Result Value Ref Range    Iron 64 40 - 170 ug/dL   TRANSFERRIN    Collection Time: 01/21/22 10:00 AM   Result Value Ref Range    Transferrin 257 200 - 370 mg/dL   PREALBUMIN    Collection  Time: 01/21/22 10:00 AM   Result Value Ref Range    Pre-Albumin 25.0 18.0 - 38.0 mg/dL   VITAMIN B12    Collection Time: 01/21/22 10:00 AM   Result Value Ref Range    Vitamin B12 -True Cobalamin 501 211 - 911 pg/mL   VITAMIN D,25 HYDROXY    Collection Time: 01/21/22 10:00 AM   Result Value Ref Range    25-Hydroxy   Vitamin D 25 40 30 - 100 ng/mL   FERRITIN    Collection Time: 01/21/22 10:00 AM   Result Value Ref Range    Ferritin 176.0 10.0 - 291.0 ng/mL   ESTIMATED GFR    Collection Time: 01/21/22 10:00 AM   Result Value Ref Range    GFR If African American >60 >60 mL/min/1.73 m 2    GFR If Non African American >60 >60 mL/min/1.73 m 2   Histology Request    Collection Time: 01/21/22  1:05 PM   Result Value Ref Range    Pathology Request Sent to Histo    Prothrombin Time (INR) (plasma)    Collection Time: 01/21/22  5:38 PM   Result Value Ref Range    PT 13.4 12.0 - 14.6 sec    INR 1.05 0.87 - 1.13   CBC without Differential (blood)    Collection Time: 01/22/22  6:38 AM   Result Value Ref Range    WBC 15.2 (H) 4.8 - 10.8 K/uL    RBC 4.15 (L) 4.20 - 5.40 M/uL    Hemoglobin 13.2 12.0 - 16.0 g/dL    Hematocrit 38.0 37.0 - 47.0 %    MCV 91.6 81.4 - 97.8 fL    MCH 31.8 27.0 - 33.0 pg    MCHC 34.7 33.6 - 35.0 g/dL    RDW 39.3 35.9 - 50.0 fL    Platelet Count 219 164 - 446 K/uL    MPV 10.6 9.0 - 12.9 fL   Comp Metabolic Panel (CMP)    Collection Time: 01/22/22  6:38 AM   Result Value Ref Range    Sodium 136 135 - 145 mmol/L    Potassium 4.3 3.6 - 5.5 mmol/L    Chloride 103 96 - 112 mmol/L    Co2 21 20 - 33 mmol/L    Anion Gap 12.0 7.0 - 16.0    Glucose 123 (H) 65 - 99 mg/dL    Bun 9 8 - 22 mg/dL    Creatinine 0.54 0.50 - 1.40 mg/dL    Calcium 8.8 8.5 - 10.5 mg/dL    AST(SGOT) 23 12 - 45 U/L    ALT(SGPT) 20 2 - 50 U/L    Alkaline Phosphatase 29 (L) 30 - 99 U/L    Total Bilirubin 0.4 0.1 - 1.5 mg/dL    Albumin 4.3 3.2 - 4.9 g/dL    Total Protein 6.6 6.0 - 8.2 g/dL    Globulin 2.3 1.9 - 3.5 g/dL    A-G Ratio 1.9 g/dL    ESTIMATED GFR    Collection Time: 01/22/22  6:38 AM   Result Value Ref Range    GFR If African American >60 >60 mL/min/1.73 m 2    GFR If Non African American >60 >60 mL/min/1.73 m 2     Medical Decision Making, by Problem:  There are no active hospital problems to display for this patient.    Plan:  Pt is alert and oriented, NAD. Breathing unlabored. Tolerating PO.  Incisions ok. VS stable. Labs reviewed.  Leukocytosis, likely reactive. Encouraged ambulation and incentive spirometry.  Wean O2. Pt may need to stay another night for nausea, pain control, hypoxia, will see how the day progresses. Otherwise okay for discharge later this afternoon. Pt also seen and examined by Dr. Ramirez.  Quality Measures:  Quality-Core Measures   Reviewed items::  Labs reviewed  Holden catheter::  No Holden  DVT prophylaxis pharmacological::  Enoxaparin (Lovenox)  DVT prophylaxis - mechanical:  SCDs  Ulcer Prophylaxis::  Yes      Discussed patient condition with Patient and Dr. Ramirez

## 2022-01-22 NOTE — PROGRESS NOTES
Bedside report received, assessment completed    A&O x  4, pt calls appropriately  Mobility: Up with SBA  Fall Risk Assessment: no, bed alarm n/a  Pain Assessment: 3/10, medication provided per MAR  Diet: CLD, bariatric   LDA:   IV Access: 20 R-hand , CDI/ flushed/ Infusing NS20K @ 150mL  + void, + flatus, 1/21 BM  DVT Prophylaxis: lovenox, SCD +    Procedures:    - 1/21 Gastric Sleeve   D/C Plan: home no additional needs at this time     Reviewed plan of care with patient, bed in lowest position and locked, pt resting comfortably now, call light within reach, all needs met at this time. Interventions will be executed per plan of care

## 2022-01-25 LAB — ZINC SERPL-MCNC: 66.6 UG/DL (ref 60–120)

## 2022-01-26 LAB — VIT B1 BLD-MCNC: 129 NMOL/L (ref 70–180)

## 2022-01-28 LAB — VIT B6 SERPL-MCNC: 186.3 NMOL/L (ref 20–125)

## 2023-07-03 NOTE — CARE PLAN
Problem: Communication  Goal: The ability to communicate needs accurately and effectively will improve  Outcome: MET Date Met: 09/11/18      Problem: Safety  Goal: Will remain free from injury  Outcome: MET Date Met: 09/11/18      Problem: Pain Management  Goal: Pain level will decrease to patient's comfort goal  Outcome: MET Date Met: 09/11/18      Problem: Infection  Goal: Will remain free from infection  Outcome: MET Date Met: 09/11/18      Problem: Psychosocial Needs:  Goal: Level of anxiety will decrease  Outcome: MET Date Met: 09/11/18      Problem: Respiratory:  Goal: Respiratory status will improve  Outcome: MET Date Met: 09/11/18        
Problem: Safety  Goal: Will remain free from injury  Outcome: PROGRESSING AS EXPECTED  Pt ambulated with sba. Instructed to use call light prior to getting oob to prevent fall. Able to make needs known.     Problem: Respiratory:  Goal: Respiratory status will improve  Outcome: MET Date Met: 09/11/18  Pt on ra. Respirations equal and unlabored. No sob.       
2

## (undated) DEVICE — SODIUM CHL IRRIGATION 0.9% 1000ML (12EA/CA)

## (undated) DEVICE — PROTECTOR ULNA NERVE - (36PR/CA)

## (undated) DEVICE — TROCAR SEPARATOR 15MMZTHREAD - (6/BX)

## (undated) DEVICE — SUCTION INSTRUMENT YANKAUER BULBOUS TIP W/O VENT (50EA/CA)

## (undated) DEVICE — SET TUBING PNEUMOCLEAR HIGH FLOW SMOKE EVACUATION (10EA/BX)

## (undated) DEVICE — TUBE E-T HI-LO CUFF 7.0MM (10EA/PK)

## (undated) DEVICE — TISSEEL 4ML ----MUST ORDER A MIN OF 6EA----

## (undated) DEVICE — NEEDLE INSUFFLATION FOR STEP - (12/BX)

## (undated) DEVICE — TROCAR STEP 11MM - (3/CA)

## (undated) DEVICE — SUTURE GENERAL

## (undated) DEVICE — ELECTRODE 850 FOAM ADHESIVE - HYDROGEL RADIOTRNSPRNT (50/PK)

## (undated) DEVICE — BANDAID X-LARGE 2 X 4 IN LF (50EA/BX)

## (undated) DEVICE — Device

## (undated) DEVICE — PACK LAPAROSCOPY - (1/CA)

## (undated) DEVICE — GLOVE BIOGEL PI INDICATOR SZ 8.0 SURGICAL PF LF -(50/BX 4BX/CA)

## (undated) DEVICE — HEAD HOLDER JUNIOR/ADULT

## (undated) DEVICE — DRAPESURG STERI-DRAPE LONG - (10/BX 4BX/CA)

## (undated) DEVICE — SET IRRIGATION CYSTOSCOPY TUBE L80 IN (20EA/CA)

## (undated) DEVICE — HEMOSTAT ARISTA PWD 5 GRAM - (5/BX)

## (undated) DEVICE — GLOVE BIOGEL PI INDICATOR SZ 7.0 SURGICAL PF LF - (50/BX 4BX/CA)

## (undated) DEVICE — SET LEADWIRE 5 LEAD BEDSIDE DISPOSABLE ECG (1SET OF 5/EA)

## (undated) DEVICE — MASK ANESTHESIA ADULT  - (100/CA)

## (undated) DEVICE — APPLICATOR DUPLO SPRAYER (5EA/CA)

## (undated) DEVICE — SUTURE2-0 27IN VCRL ANTI VIOL (36PK/BX)

## (undated) DEVICE — KIT ANESTHESIA W/CIRCUIT & 3/LT BAG W/FILTER (20EA/CA)

## (undated) DEVICE — SET SUCTION/IRRIGATION WITH DISPOSABLE TIP (6/CA )PART #0250-070-520 IS A SUB

## (undated) DEVICE — ELECTRODE 5MM LHK LAPSCP STERILE DISP- MEGADYNE  (5/CA)

## (undated) DEVICE — MAT PATIENT POSITIONING PREVALON (10EA/CA)

## (undated) DEVICE — TUBING CLEARLINK DUO-VENT - C-FLO (48EA/CA)

## (undated) DEVICE — LIGASURE VESSEL SEAL LAP 10MM - SINGLE USE (6EA/CA)

## (undated) DEVICE — GLOVE SZ 7 BIOGEL PI MICRO - PF LF (50PR/BX 4BX/CA)

## (undated) DEVICE — MANIFOLD NEPTUNE 1 PORT (20/PK)

## (undated) DEVICE — SUTURE 0 VICRYL PLUS CT-2 - 27 INCH (36/BX)

## (undated) DEVICE — CANISTER SUCTION 3000ML MECHANICAL FILTER AUTO SHUTOFF MEDI-VAC NONSTERILE LF DISP  (40EA/CA)

## (undated) DEVICE — LACTATED RINGERS INJ 1000 ML - (14EA/CA 60CA/PF)

## (undated) DEVICE — GLOVE BIOGEL PI INDICATOR SZ 7.5 SURGICAL PF LF -(50/BX 4BX/CA)

## (undated) DEVICE — CHLORAPREP 26 ML APPLICATOR - ORANGE TINT(25/CA)

## (undated) DEVICE — BAG RETRIEVAL 12/15 MM INZII (5EA/CA) THIS WILL REPLACE ITEM 75018

## (undated) DEVICE — SLEEVE, VASO, REPROC, LARGE

## (undated) DEVICE — NEPTUNE 4 PORT MANIFOLD - (20/PK)

## (undated) DEVICE — STAPLER POWERED 60MM (3EA/BX)

## (undated) DEVICE — SLEEVE, VASO, THIGH, MED

## (undated) DEVICE — CLIP APPLIER 10MM ENDO LARGE (3EA/BX)

## (undated) DEVICE — SUTURE 0 VICRYL PLUS UR-6 - 27 INCH (36/BX)

## (undated) DEVICE — SYSTEM CALIBARATION  GASTRECTOMY 40FR WITH BULB (5/BX)

## (undated) DEVICE — GLOVE BIOGEL PI ULTRATOUCH SZ 7.0 SURGICAL PF LF- POWDER FREE (50/BX 4BX/CA)

## (undated) DEVICE — RELOAD WITH GRIPPING SURFACE TECHNOLOGY BLUE 60MM (12EA/BX)

## (undated) DEVICE — CATHETER IV 20 GA X 1-1/4 ---SURG.& SDS ONLY--- (50EA/BX)

## (undated) DEVICE — GLOVE BIOGEL SZ 7.5 SURGICAL PF LTX - (50PR/BX 4BX/CA)

## (undated) DEVICE — ELECTRODE DUAL RETURN W/ CORD - (50/PK)

## (undated) DEVICE — GOWN WARMING STANDARD FLEX - (30/CA)

## (undated) DEVICE — DERMABOND ADVANCED - (12EA/BX)

## (undated) DEVICE — GOWN WARMING X-LARGE FLEX - (20/CA)

## (undated) DEVICE — SUTURE 4-0 MONOCRYL PLUS PS-2 - 27 INCH (36/BX)

## (undated) DEVICE — PACK GASTRIC BANDING OR - (1/CA)

## (undated) DEVICE — TROCAR 5X100 NON BLADED Z-TH - READ KII (6/BX)

## (undated) DEVICE — KIT  I.V. START (100EA/CA)

## (undated) DEVICE — GOWN SURGEONS LARGE - (32/CA)

## (undated) DEVICE — GOWN SURGEONS X-LARGE - DISP. (30/CA)

## (undated) DEVICE — SUTURE 4-0 VICRYL PLUSFS-1 - 27 INCH (36/BX)

## (undated) DEVICE — CURVED JAW SEALER POWERSEAL 5MM 37CM

## (undated) DEVICE — RELOAD WITH GRIPPING SURFACE TECHNOLOGY GOLD 60MM (12EA/BX)

## (undated) DEVICE — PENCIL ELECTSURG 10FT BTN SWH - (50/CA)

## (undated) DEVICE — ARMREST CRADLE FOAM - (2PR/PK 12PR/CA)

## (undated) DEVICE — CANISTER SUCTION RIGID RED 1500CC (40EA/CA)

## (undated) DEVICE — GLOVE SIZE 6.5  SURGEON ACCELERATOR FREE GREEN (50PR/BX)

## (undated) DEVICE — GLOVE SZ 7.5 BIOGEL PI MICRO - PF LF (50PR/BX)

## (undated) DEVICE — PERISTRIP 60 STAPLE LINE REINFORCEMENT (6EA/CA)

## (undated) DEVICE — TUBING SETDISPOS HIGH FLOW II - (10/BX)

## (undated) DEVICE — SUTURE 0 LIGATING REEL VICRYL PLUS (12PK/BX)

## (undated) DEVICE — PAD SANITARY 11IN MAXI IND WRAPPED  (12EA/PK 24PK/CA)

## (undated) DEVICE — TOWEL STOP TIMEOUT SAFETY FLAG (40EA/CA)

## (undated) DEVICE — SENSOR SPO2 NEO LNCS ADHESIVE (20/BX) SEE USER NOTES

## (undated) DEVICE — TUBE CONNECTING SUCTION - CLEAR PLASTIC STERILE 72 IN (50EA/CA)

## (undated) DEVICE — SUTURE 0 VICRYL PLUS CT-1 - 36 INCH (36/BX)

## (undated) DEVICE — SEALER VESSEL HARMONIC ACE PLUS WITH ADVANCED HEMOSTASIS 36CM (1/EA)

## (undated) DEVICE — SUTURE 2-0 VICRYL PLUS CT-1 36 (36PK/BX)"

## (undated) DEVICE — SET EXTENSION WITH 2 PORTS (48EA/CA) ***PART #2C8610 IS A SUBSTITUTE*****

## (undated) DEVICE — CANNULA W/SEAL 5X100 Z-THRE - ADED KII (12/BX)

## (undated) DEVICE — RELOAD WITH GRIPPING SURGACE TECHNOLOGY GREEN 60MM (12EA/BX)

## (undated) DEVICE — APPICATOR HEMOSTAT ARISTA XL - FLEXITIP (10EA/CA)

## (undated) DEVICE — TRAY SRGPRP PVP IOD WT PRP - (20/CA)

## (undated) DEVICE — TRAY FOLEY CATHETER STATLOCK 16FR SURESTEP  (10EA/CA)